# Patient Record
Sex: FEMALE | Race: WHITE | NOT HISPANIC OR LATINO | Employment: FULL TIME | URBAN - METROPOLITAN AREA
[De-identification: names, ages, dates, MRNs, and addresses within clinical notes are randomized per-mention and may not be internally consistent; named-entity substitution may affect disease eponyms.]

---

## 2020-10-25 ENCOUNTER — OFFICE VISIT (OUTPATIENT)
Dept: URGENT CARE | Facility: CLINIC | Age: 55
End: 2020-10-25
Payer: COMMERCIAL

## 2020-10-25 VITALS
HEART RATE: 81 BPM | RESPIRATION RATE: 16 BRPM | TEMPERATURE: 97 F | OXYGEN SATURATION: 99 % | SYSTOLIC BLOOD PRESSURE: 113 MMHG | DIASTOLIC BLOOD PRESSURE: 69 MMHG

## 2020-10-25 DIAGNOSIS — Z20.822 EXPOSURE TO COVID-19 VIRUS: Primary | ICD-10-CM

## 2020-10-25 LAB
CTP QC/QA: YES
SARS-COV-2 RDRP RESP QL NAA+PROBE: NEGATIVE

## 2020-10-25 PROCEDURE — U0002 COVID-19 LAB TEST NON-CDC: HCPCS | Mod: QW,S$GLB,, | Performed by: INTERNAL MEDICINE

## 2020-10-25 PROCEDURE — 99214 OFFICE O/P EST MOD 30 MIN: CPT | Mod: S$GLB,CS,, | Performed by: INTERNAL MEDICINE

## 2020-10-25 PROCEDURE — U0002: ICD-10-PCS | Mod: QW,S$GLB,, | Performed by: INTERNAL MEDICINE

## 2020-10-25 PROCEDURE — 99214 PR OFFICE/OUTPT VISIT, EST, LEVL IV, 30-39 MIN: ICD-10-PCS | Mod: S$GLB,CS,, | Performed by: INTERNAL MEDICINE

## 2020-10-25 RX ORDER — BUTALBITAL, ACETAMINOPHEN AND CAFFEINE 50; 325; 40 MG/1; MG/1; MG/1
TABLET ORAL
COMMUNITY
Start: 2020-10-09

## 2020-10-25 RX ORDER — QUETIAPINE FUMARATE 25 MG/1
TABLET, FILM COATED ORAL
COMMUNITY
Start: 2020-10-20 | End: 2022-09-28 | Stop reason: CLARIF

## 2020-10-25 RX ORDER — OMEPRAZOLE 40 MG/1
40 CAPSULE, DELAYED RELEASE ORAL DAILY
COMMUNITY
Start: 2020-08-16

## 2020-10-25 RX ORDER — SITAGLIPTIN AND METFORMIN HYDROCHLORIDE 1000; 50 MG/1; MG/1
1 TABLET, FILM COATED ORAL 2 TIMES DAILY WITH MEALS
COMMUNITY
Start: 2020-10-09

## 2020-10-25 RX ORDER — FLUTICASONE PROPIONATE 50 MCG
SPRAY, SUSPENSION (ML) NASAL
COMMUNITY
Start: 2020-10-10 | End: 2022-09-28 | Stop reason: CLARIF

## 2020-10-25 NOTE — PATIENT INSTRUCTIONS
Below are suggestions for symptomatic relief:    - Tylenol every 4 hours OR ibuprofen every 6 hours as needed for pain/fever/chills/body aches  - Salt water gargles to soothe throat pain.  - Chloroseptic spray also helps to numb throat pain.  - Warm face compresses to help with facial sinus pain/pressure.  - Vicks vapor rub at night.  - Flonase OTC nasal spray for nasal congestion.  - Simple foods like chicken noodle soup, smoothies, hot tea with lemon and honey  - If you were not given a prescription cough medication, then Delsym OTC helps with coughing at night  - Zyrtec/Claritin during the day & Benadryl at night may help with any allergies        Please follow up with your primary care provider within 2-5 days if your signs and symptoms have not resolved or worsen.    Please arrange follow up with your primary care doctor as soon as possible. You must understand that you've received an Urgent Care treatment only and that you may be released before all of your medical problems are known or treated. You, the patient, will arrange for follow up as instructed. If your symptoms worsen or fail to improve you should go to the Emergency Room.    *Please go immediately to the Emergency Department if you develop shortness of breath, chest pain, and uncontrollable fever above 100.4F            SOCIAL DISTANCE + WEAR A MASK :)      Instructions for Patients with Confirmed or Suspected COVID-19    If you are awaiting your test result, you will either be called or it will be released to the patient portal.  If you have any questions about your test, please visit [email or web address] or call our COVID-19 information line at [phone].      Instructions for non-hospitalized or discharged patients with confirmed or suspected COVID-19:       Stay home except to get medical care.    Separate yourself from other people and animals in your home.    Call ahead before visiting your doctor.    Wear a face mask.     Cover your coughs and sneezes.    Clean your hands often.    Avoid sharing personal household items.    Clean all high-touch surfaces every day.    Monitor your symptoms. Seek prompt medical attention if your illness is worsening (e.g., difficulty breathing). Before seeking care, call your healthcare provider.    If you have a medical emergency and must call 911, notify the dispatcher that you have or are being evaluated for COVID-19. If possible, put on a face mask before emergency medical services arrive.    Use the following symptom-based strategy to return to normal activity following a suspected or confirmed case of COVID-19. Continue isolation until:   o At least 3 days (72 hours) have passed since recovery defined as resolution of fever without the use of fever-reducing medications and improvement in respiratory symptoms (e.g. cough, shortness of breath), and   o At least 10 days have passed since the first positive test.       As one of the next steps, you will receive a call or text from the [place] [facility] ([facility]) COVID-19 Tracing Team. See the contact information below so you know not to ignore the health departments call. It is important that you contact them back immediately so they can help.     Contact Tracer Number:  [phone]  Caller ID for most carriers: [facility]    What is contact tracing?   Contact tracing is a process that helps identify everyone who has been in close contact with an infected person. Contact tracers let those people know they may have been exposed and guide them on next steps. Confidentiality is important for everyone; no one will be told who may have exposed them to the virus.   Your involvement is important. The more we know about where and how this virus is spreading, the better chance we have at stopping it from spreading further.  What does exposure mean?   Exposure means you have been within 6 feet for more than 15 minutes with a  person who has or had COVID-19.  What kind of questions do the contact tracers ask?   A contact tracer will confirm your basic contact information including name, address, phone number, and next of kin, as well as asking about any symptoms you may have had. Theyll also ask you how you think you may have gotten sick, such as places where you may have been exposed to the virus, and people you were with. Those names will never be shared with anyone outside of that call, and will only be used to help trace and stop the spread of the virus.   I have privacy concerns. How will the state use my information?   Your privacy about your health is important. All calls are completed using call centers that use the appropriate health privacy protection measures (HIPAA compliance), meaning that your patient information is safe. No one will ever ask you any questions related to immigration status. Your health comes first.   Do I have to participate?   You do not have to participate, but we strongly encourage you to. Contact tracing can help us catch and control new outbreaks as theyre developing to keep your friends and family safe.   What if I dont hear from anyone?   If you dont receive a call within 24 hours, you can call the number above right away to inquire about your status. That line is open from 8:00 am - 8:00 p.m., 7 days a week.  Contact tracing saves lives! Together, we have the power to beat this virus and keep our loved ones and neighbors safe.       Instructions for household members, intimate partners and caregivers in a non-healthcare setting of a patient with confirmed or suspected COVID-19:         Close contacts should monitor their health and call their healthcare provider right away if they develop symptoms suggestive of COVID-19 (e.g., fever, cough, shortness of breath).    Stay home except to get medical care. Separate yourself from other people and animals in the home.   Monitor the patients  symptoms. If the patient is getting sicker, call his or her healthcare provider. If the patient has a medical emergency and you need to call 911, notify the dispatch personnel that the patient has or is being evaluated for COVID-19.    Wear a facemask when around other people such as sharing a room or vehicle and before entering a healthcare provider's office.   Cover coughs and sneezes with a tissue. Throw used tissues in a lined trash can immediately and wash hands.   Clean hands often with soap and water for at least 20 seconds or with an alcohol-based hand , rubbing hands together until they feel dry. Avoid touching your eyes, nose, and mouth with unwashed hands.   Clean all high-touch; surfaces every day, including counters, tabletops, doorknobs, bathroom fixtures, toilets, phones, keyboards, tablets, bedside tables, etc. Use a household cleaning spray or wipe according to label instructions.   Avoid sharing personal household items such as dishes, drinking glasses, cups, towels, bedding, etc. After these items are used, they should be washed thoroughly with soap and water.   Continue isolation until:   At least 3 days (72 hours) have passed since recovery defined as resolution of fever without the use of fever-reducing medications and improvement in respiratory symptoms (e.g. cough, shortness of breath), and    At least 10 days have passed since the patients first positive test.    https://www.cdc.gov/coronavirus/2019-ncov/your-health/index.htm

## 2020-10-25 NOTE — LETTER
[address], [address] ? [place], [address] ? Phone [phone] ? Fax [phone]           Return to Work/School    Patient: Kelli Rosas  YOB: 1965   Date: 10/25/2020      To Whom It May Concern:     Kelli Rosas was in contact with/seen in my office on 10/25/2020. COVID-19 is present in our communities across the [facility]. Not all patients are eligible or appropriate to be tested. In this situation, your employee meets the following criteria:     Kelli Rosas has met the criteria for COVID-19 testing and has a NEGATIVE result. The employee can return to work once they are asymptomatic for 24 hours without the use of fever reducing medications (Tylenol, Motrin, etc).     If you have any questions or concerns, or if I can be of further assistance, please do not hesitate to contact me.     Sincerely,    Johanna Brown PA-C

## 2020-10-25 NOTE — PROGRESS NOTES
Subjective:       Patient ID: Kelli Rosas is a 54 y.o. female.    Vitals:  temperature is 97.3 °F (36.3 °C). Her blood pressure is 113/69 and her pulse is 81. Her respiration is 16 and oxygen saturation is 99%.     Chief Complaint: COVID-19 Concerns    54-year-old female with past medical history of hypertension presents to urgent care complaining of scratchy throat, body aches and fatigue that started this morning after she woke up.  Symptoms have been constant and mild since onset.  Patient states that she had COVID back in March, but she is concerned she may have COVID again as she just found out she was exposed to her co-worker who tested positive for COVID-19 2 days ago.  Patient has been taking over-the-counter cold and flu which helped her sleep and relieved her itchy throat. Pt denies fever, chills, ear pain, nasal congestion,  loss of smell/taste, cough, SOB, chest pain, N/V/D, abdominal pain, back pain, neck pain, headache.      Fatigue  This is a new problem. The current episode started yesterday. The problem occurs constantly. The problem has been unchanged. Associated symptoms include a sore throat. Pertinent negatives include no arthralgias, chest pain, chills, congestion, coughing, fatigue, fever, headaches, joint swelling, myalgias, nausea, rash, vertigo, vomiting or weakness. Associated symptoms comments: Body aches. Treatments tried: otc cold and flu. The treatment provided mild relief.       Constitution: Negative for chills, fatigue and fever.   HENT: Positive for sore throat. Negative for congestion.    Neck: Negative for painful lymph nodes.   Cardiovascular: Negative for chest pain and leg swelling.   Eyes: Negative for double vision and blurred vision.   Respiratory: Negative for cough and shortness of breath.    Gastrointestinal: Negative for nausea, vomiting and diarrhea.   Genitourinary: Negative for dysuria, frequency, urgency and history of kidney stones.   Musculoskeletal:  Negative for joint pain, joint swelling, muscle cramps and muscle ache.   Skin: Negative for color change, pale, rash and bruising.   Allergic/Immunologic: Negative for seasonal allergies.   Neurological: Negative for dizziness, history of vertigo, light-headedness, passing out and headaches.   Hematologic/Lymphatic: Negative for swollen lymph nodes.   Psychiatric/Behavioral: Negative for nervous/anxious, sleep disturbance and depression. The patient is not nervous/anxious.        Objective:      Physical Exam   Constitutional: She is oriented to person, place, and time. She appears well-developed.  Non-toxic appearance. She does not appear ill. No distress.   HENT:   Head: Normocephalic and atraumatic.   Ears:   Right Ear: No swelling or tenderness. Tympanic membrane is not erythematous, not retracted and not bulging. A middle ear effusion is present.   Left Ear: No swelling or tenderness. Tympanic membrane is not erythematous, not retracted and not bulging. A middle ear effusion is present.   Nose: No mucosal edema or rhinorrhea. Right sinus exhibits no maxillary sinus tenderness and no frontal sinus tenderness. Left sinus exhibits no maxillary sinus tenderness and no frontal sinus tenderness.   Mouth/Throat: Uvula is midline and mucous membranes are normal. No trismus in the jaw. No uvula swelling. Posterior oropharyngeal edema and posterior oropharyngeal erythema present. No oropharyngeal exudate or tonsillar abscesses. No tonsillar exudate.   Neck: Neck supple.   Cardiovascular: Normal rate and regular rhythm.   Pulmonary/Chest: Effort normal. No respiratory distress.   Abdominal: Normal appearance.   Musculoskeletal: Normal range of motion.   Lymphadenopathy:     She has no cervical adenopathy.   Neurological: She is alert and oriented to person, place, and time.   Skin: Skin is warm, dry and not diaphoretic.   Nursing note and vitals reviewed.        Assessment:       1. Exposure to COVID-19 virus         Plan:         Exposure to COVID-19 virus  -     POCT COVID-19 Rapid Screening      Results for orders placed or performed in visit on 10/25/20   POCT COVID-19 Rapid Screening   Result Value Ref Range    POC Rapid COVID Negative Negative     Acceptable Yes      *Discussed results/diagnosis/plan with patient in clinic. Answered all of patient's questions and concerns. Patient was given strict ED instructions. Patient verbally understood and agreed with treatment plan.       Patient Instructions   Below are suggestions for symptomatic relief:    - Tylenol every 4 hours OR ibuprofen every 6 hours as needed for pain/fever/chills/body aches  - Salt water gargles to soothe throat pain.  - Chloroseptic spray also helps to numb throat pain.  - Warm face compresses to help with facial sinus pain/pressure.  - Vicks vapor rub at night.  - Flonase OTC nasal spray for nasal congestion.  - Simple foods like chicken noodle soup, smoothies, hot tea with lemon and honey  - If you were not given a prescription cough medication, then Delsym OTC helps with coughing at night  - Zyrtec/Claritin during the day & Benadryl at night may help with any allergies        Please follow up with your primary care provider within 2-5 days if your signs and symptoms have not resolved or worsen.    Please arrange follow up with your primary care doctor as soon as possible. You must understand that you've received an Urgent Care treatment only and that you may be released before all of your medical problems are known or treated. You, the patient, will arrange for follow up as instructed. If your symptoms worsen or fail to improve you should go to the Emergency Room.    *Please go immediately to the Emergency Department if you develop shortness of breath, chest pain, and uncontrollable fever above 100.4F            SOCIAL DISTANCE + WEAR A MASK :)      Instructions for Patients with Confirmed or Suspected COVID-19    If you are awaiting  your test result, you will either be called or it will be released to the patient portal.  If you have any questions about your test, please visit [email or web address] or call our COVID-19 information line at [phone].      Instructions for non-hospitalized or discharged patients with confirmed or suspected COVID-19:       Stay home except to get medical care.    Separate yourself from other people and animals in your home.    Call ahead before visiting your doctor.    Wear a face mask.    Cover your coughs and sneezes.    Clean your hands often.    Avoid sharing personal household items.    Clean all high-touch surfaces every day.    Monitor your symptoms. Seek prompt medical attention if your illness is worsening (e.g., difficulty breathing). Before seeking care, call your healthcare provider.    If you have a medical emergency and must call 911, notify the dispatcher that you have or are being evaluated for COVID-19. If possible, put on a face mask before emergency medical services arrive.    Use the following symptom-based strategy to return to normal activity following a suspected or confirmed case of COVID-19. Continue isolation until:   o At least 3 days (72 hours) have passed since recovery defined as resolution of fever without the use of fever-reducing medications and improvement in respiratory symptoms (e.g. cough, shortness of breath), and   o At least 10 days have passed since the first positive test.       As one of the next steps, you will receive a call or text from the [facility] ([facility]) COVID-19 Tracing Team. See the contact information below so you know not to ignore the health departments call. It is important that you contact them back immediately so they can help.     Contact Tracer Number:  [phone]  Caller ID for most carriers: [place] [facility]    What is contact tracing?   Contact tracing is a process that helps identify everyone who has been  in close contact with an infected person. Contact tracers let those people know they may have been exposed and guide them on next steps. Confidentiality is important for everyone; no one will be told who may have exposed them to the virus.   Your involvement is important. The more we know about where and how this virus is spreading, the better chance we have at stopping it from spreading further.  What does exposure mean?   Exposure means you have been within 6 feet for more than 15 minutes with a person who has or had COVID-19.  What kind of questions do the contact tracers ask?   A contact tracer will confirm your basic contact information including name, address, phone number, and next of kin, as well as asking about any symptoms you may have had. Theyll also ask you how you think you may have gotten sick, such as places where you may have been exposed to the virus, and people you were with. Those names will never be shared with anyone outside of that call, and will only be used to help trace and stop the spread of the virus.   I have privacy concerns. How will the state use my information?   Your privacy about your health is important. All calls are completed using call centers that use the appropriate health privacy protection measures (HIPAA compliance), meaning that your patient information is safe. No one will ever ask you any questions related to immigration status. Your health comes first.   Do I have to participate?   You do not have to participate, but we strongly encourage you to. Contact tracing can help us catch and control new outbreaks as theyre developing to keep your friends and family safe.   What if I dont hear from anyone?   If you dont receive a call within 24 hours, you can call the number above right away to inquire about your status. That line is open from 8:00 am - 8:00 p.m., 7 days a week.  Contact tracing saves lives! Together, we have the power to beat this virus and keep our  loved ones and neighbors safe.       Instructions for household members, intimate partners and caregivers in a non-healthcare setting of a patient with confirmed or suspected COVID-19:         Close contacts should monitor their health and call their healthcare provider right away if they develop symptoms suggestive of COVID-19 (e.g., fever, cough, shortness of breath).    Stay home except to get medical care. Separate yourself from other people and animals in the home.   Monitor the patients symptoms. If the patient is getting sicker, call his or her healthcare provider. If the patient has a medical emergency and you need to call 911, notify the dispatch personnel that the patient has or is being evaluated for COVID-19.    Wear a facemask when around other people such as sharing a room or vehicle and before entering a healthcare provider's office.   Cover coughs and sneezes with a tissue. Throw used tissues in a lined trash can immediately and wash hands.   Clean hands often with soap and water for at least 20 seconds or with an alcohol-based hand , rubbing hands together until they feel dry. Avoid touching your eyes, nose, and mouth with unwashed hands.   Clean all high-touch; surfaces every day, including counters, tabletops, doorknobs, bathroom fixtures, toilets, phones, keyboards, tablets, bedside tables, etc. Use a household cleaning spray or wipe according to label instructions.   Avoid sharing personal household items such as dishes, drinking glasses, cups, towels, bedding, etc. After these items are used, they should be washed thoroughly with soap and water.   Continue isolation until:   At least 3 days (72 hours) have passed since recovery defined as resolution of fever without the use of fever-reducing medications and improvement in respiratory symptoms (e.g. cough, shortness of breath), and    At least 10 days have passed since the patients first positive  test.    https://www.cdc.gov/coronavirus/2019-ncov/your-health/index.htm

## 2022-08-06 ENCOUNTER — HOSPITAL ENCOUNTER (EMERGENCY)
Facility: HOSPITAL | Age: 57
Discharge: HOME | End: 2022-08-06
Attending: EMERGENCY MEDICINE
Payer: COMMERCIAL

## 2022-08-06 VITALS
OXYGEN SATURATION: 97 % | BODY MASS INDEX: 38.32 KG/M2 | DIASTOLIC BLOOD PRESSURE: 83 MMHG | HEIGHT: 65 IN | WEIGHT: 230 LBS | TEMPERATURE: 98 F | RESPIRATION RATE: 20 BRPM | HEART RATE: 113 BPM | SYSTOLIC BLOOD PRESSURE: 136 MMHG

## 2022-08-06 DIAGNOSIS — T14.90XA TRAUMA: ICD-10-CM

## 2022-08-06 DIAGNOSIS — S92.521A CLOSED DISPLACED FRACTURE OF MIDDLE PHALANX OF LESSER TOE OF RIGHT FOOT, INITIAL ENCOUNTER: Primary | ICD-10-CM

## 2022-08-06 PROCEDURE — 25000003 PHARM REV CODE 250: Mod: ER | Performed by: EMERGENCY MEDICINE

## 2022-08-06 PROCEDURE — 96372 THER/PROPH/DIAG INJ SC/IM: CPT | Performed by: EMERGENCY MEDICINE

## 2022-08-06 PROCEDURE — 99284 EMERGENCY DEPT VISIT MOD MDM: CPT | Mod: 25,ER

## 2022-08-06 PROCEDURE — 63600175 PHARM REV CODE 636 W HCPCS: Mod: ER | Performed by: EMERGENCY MEDICINE

## 2022-08-06 RX ORDER — KETOROLAC TROMETHAMINE 30 MG/ML
30 INJECTION, SOLUTION INTRAMUSCULAR; INTRAVENOUS
Status: COMPLETED | OUTPATIENT
Start: 2022-08-06 | End: 2022-08-06

## 2022-08-06 RX ORDER — FAMOTIDINE 20 MG/1
20 TABLET, FILM COATED ORAL 2 TIMES DAILY
Qty: 60 TABLET | Refills: 0 | Status: SHIPPED | OUTPATIENT
Start: 2022-08-06 | End: 2023-08-06

## 2022-08-06 RX ORDER — OXYCODONE AND ACETAMINOPHEN 5; 325 MG/1; MG/1
1 TABLET ORAL
Status: COMPLETED | OUTPATIENT
Start: 2022-08-06 | End: 2022-08-06

## 2022-08-06 RX ORDER — MELOXICAM 15 MG/1
15 TABLET ORAL DAILY
Qty: 30 TABLET | Refills: 0 | Status: SHIPPED | OUTPATIENT
Start: 2022-08-06 | End: 2022-09-28 | Stop reason: CLARIF

## 2022-08-06 RX ORDER — OXYCODONE AND ACETAMINOPHEN 5; 325 MG/1; MG/1
1 TABLET ORAL EVERY 4 HOURS PRN
Qty: 12 TABLET | Refills: 0 | Status: SHIPPED | OUTPATIENT
Start: 2022-08-06 | End: 2022-10-04

## 2022-08-06 RX ADMIN — KETOROLAC TROMETHAMINE 30 MG: 30 INJECTION, SOLUTION INTRAMUSCULAR at 10:08

## 2022-08-06 RX ADMIN — OXYCODONE AND ACETAMINOPHEN 1 TABLET: 5; 325 TABLET ORAL at 10:08

## 2022-08-06 NOTE — Clinical Note
"Kelli Maldonadojoseline Rosas was seen and treated in our emergency department on 8/6/2022.  She may return to work on 08/10/2022.       If you have any questions or concerns, please don't hesitate to call.      Mehran Jones MD"

## 2022-08-06 NOTE — ED PROVIDER NOTES
Encounter Date: 2022       History     Chief Complaint   Patient presents with    Fall     Pt reports slip and fall prior to arrival, pain to right third toe, deformity noted. States pain does radiate to right ankle.     56 y.o. female Past Medical History:  No date: Hypertension     Endorses mechanical fall onto L knee prior to arrival, notes no knee pain, no ankle pain, endorses R 3rd toe pain/deformity. Denies hitting her head, no neck/back pain        Review of patient's allergies indicates:  No Known Allergies  Past Medical History:   Diagnosis Date    Hypertension      Past Surgical History:   Procedure Laterality Date     SECTION      NECK MASS EXCISION      pph hemorrhoidectomy       Family History   Problem Relation Age of Onset    Hypertension Paternal Grandmother      Social History     Tobacco Use    Smoking status: Never Smoker    Smokeless tobacco: Never Used   Substance Use Topics    Alcohol use: Not Currently     Comment: social    Drug use: No     Review of Systems   Constitutional: Negative for fever.   HENT: Negative for sore throat.    Respiratory: Negative for shortness of breath.    Cardiovascular: Negative for chest pain.   Gastrointestinal: Negative for nausea.   Genitourinary: Negative for dysuria.   Musculoskeletal: Negative for back pain.   Skin: Negative for rash.   Neurological: Negative for weakness.   Hematological: Does not bruise/bleed easily.   All other systems reviewed and are negative.      Physical Exam     Initial Vitals [22 0947]   BP Pulse Resp Temp SpO2   136/83 (!) 113 18 98.1 °F (36.7 °C) 97 %      MAP       --         Physical Exam    Nursing note and vitals reviewed.  Constitutional: She appears well-developed and well-nourished.   HENT:   Head: Normocephalic and atraumatic.   Eyes: Conjunctivae and EOM are normal. Pupils are equal, round, and reactive to light.   Neck:   Normal range of motion.  Cardiovascular: Normal rate.   Pulmonary/Chest:  No respiratory distress.   Abdominal: She exhibits no distension.   Musculoskeletal:         General: Normal range of motion.      Cervical back: Normal range of motion.     Neurological: She is alert. No cranial nerve deficit. GCS score is 15. GCS eye subscore is 4. GCS verbal subscore is 5. GCS motor subscore is 6.   Skin: Skin is warm and dry.   Psychiatric: She has a normal mood and affect. Thought content normal.     R knee: no ttp/effusion, no ant/post drawer/med/lat laxity, no prox tib/fib ttp, no distal tib/fib ttp, no med/lat malleolar ttp    R 3rd toe deformity, good cap refill  L knee: stable, no joint line ttp, no effusion, no patellar ttp, patella in appropriate position, no ant/post drawer, no med/lab laxity.  ED Course   Procedures  Labs Reviewed - No data to display       Imaging Results          X-Ray Foot Complete Right (Final result)  Result time 08/06/22 10:23:52    Final result by Ovidio Meza MD (08/06/22 10:23:52)                 Impression:      Mildly displaced and angulated acute fracture of the 3rd proximal phalanx.      Electronically signed by: Ovidio Meza MD  Date:    08/06/2022  Time:    10:23             Narrative:    EXAMINATION:  XR FOOT COMPLETE 3 VIEW RIGHT    CLINICAL HISTORY:  Injury, unspecified, initial encounter.    TECHNIQUE:  AP, lateral, and oblique views of the right foot were performed.    COMPARISON:  None.    FINDINGS:  Mildly displaced acute fracture involving the distal aspect of the 3rd proximal phalanx.  Mild lateral angulation of the fracture.  No additional acute fracture identified.  No dislocation.  There is a plantar calcaneal spur.  No unexpected radiopaque foreign body.                                 Medications   oxyCODONE-acetaminophen 5-325 mg per tablet 1 tablet (1 tablet Oral Given 8/6/22 1009)   ketorolac injection 30 mg (30 mg Intramuscular Given 8/6/22 1009)                   xr foot, flat sole shoe. Pain control.     Crutches.  X-Ray Foot  Complete Right   Final Result      Mildly displaced and angulated acute fracture of the 3rd proximal phalanx.         Electronically signed by: Ovidio Meza MD   Date:    08/06/2022   Time:    10:23          I have corky taped toe. Will refer to podiatry    Clinical Impression:   Final diagnoses:  [T14.90XA] Trauma  [T14.90XA] Trauma - 3rd toe pain/deformity  [S92.521A] Closed displaced fracture of middle phalanx of lesser toe of right foot, initial encounter (Primary)          ED Disposition Condition    Discharge Stable        ED Prescriptions     None        Follow-up Information    None          Mehran Jones MD  08/06/22 6457

## 2022-08-06 NOTE — DISCHARGE INSTRUCTIONS
Thank you for coming to our Emergency Department today. It is important to remember that some problems or medical conditions are difficult to diagnose and may not be found during your Emergency Department visit.     Be sure to follow up with your primary care doctor and review all labs/imaging/tests that were performed during your ER visit with them. Some labs/tests may be outside of the normal range and require non-emergent follow-up and further investigation to help diagnose/exclude/prevent complications or other potentially serious medical conditions that were not addressed during your ER visit.    If you do not have a primary care doctor, you may contact the one listed on your discharge paperwork or you may also call the [facility] Appointment Desk at [phone] to schedule an appointment and establish care with one. It is important to your health that you have a primary care doctor.    Please take all medications as directed. All medications may potentially have side-effects and it is impossible to predict which medications may give you side-effects or what side-effects (if any) they will give you.. If you feel that you are having a negative effect or side-effect of any medication you should immediately stop taking them and seek medical attention. If you feel that you are having a life-threatening reaction call 911.    Return to the ER with any questions/concerns, new/concerning symptoms, worsening or failure to improve.     Do not drive, swim, climb to height, take a bath, operate heavy machinery, drink alcohol or take potentially sedating medications, sign any legal documents or make any important decisions for 24 hours if you have received any pain medications, sedatives or mood altering drugs during your ER visit or within 24 hours of taking them if they have been prescribed to you.     You can find additional resources for Dentists, hearing aids, durable medical equipment, low cost pharmacies and  other resources at https://[facility].org    BELOW THIS LINE ONLY APPLIES IF YOU HAVE A COVID TEST PENDING OR IF YOU HAVE BEEN DIAGNOSED WITH COVID:  Please access [facility] to review the results of your test. Until the results of your COVID test return, you should isolate yourself so as not to potentially spread illness to others.   If your COVID test returns positive, you should isolate yourself so as not to spread illness to others. After five full days, if you are feeling better and you have not had fever for 24 hours, you can return to your typical daily activities, but you must wear a mask around others for an additional 5 days.   If your COVID test returns negative and you are either unvaccinated or more than six months out from your two-dose vaccine and are not yet boosted, you should still quarantine for 5 full days followed by strict mask use for an additional 5 full days.   If your COVID test returns negative and you have received your 2-dose initial vaccine as well as a booster, you should continue strict mask use for 10 full days after the exposure.  For all those exposed, best practice includes a test at day 5 after the exposure. This can be a home test or a test through one of the many testing centers throughout our community.   Masking is always advised to limit the spread of COVID. Cdc.gov is an excellent site to obtain the latest up to date recommendations regarding COVID and COVID testing.     CDC Testing and Quarantine Guidelines for patients with exposure to a known-positive COVID-19 person:  A close exposure is defined as anyone who has had an exposure (masked or unmasked) to a known COVID -19 positive person within 6 feet of someone for a cumulative total of 15 minutes or more over a 24-hour period.   Vaccinated and/or if you recently had a positive covid test within 90 days do NOT need to quarantine after contact with someone who had COVID-19 unless you develop symptoms.   Fully vaccinated  people who have not had a positive test within 90 days, should get tested 3-5 days after their exposure, even if they don't have symptoms and wear a mask indoors in public for 14 days following exposure or until their test result is negative.      Unvaccinated and/or NOT had a positive test within 90 days and meet close exposure  You are required by CDC guidelines to quarantine for at least 5 days from time of exposure followed by 5 days of strict masking. It is recommended, but not required to test after 5 days, unless you develop symptoms, in which case you should test at that time.  If you get tested after 5 days and your test is positive, your 5 day period of isolation starts the day of the positive test.    If your exposure does not meet the above definition, you can return to your normal daily activities to include social distancing, wearing a mask and frequent handwashing.      Here is a link to guidance from the CDC:  https://www.cdc.gov/media/releases/2021/s1227-isolation-quarantine-guidance.html      [place] Dept Of Health Testing Sites:  https://ldh.la.gov/page/3934      [facility] website with testing locations and guidance:  [email or web address]

## 2022-09-21 ENCOUNTER — TELEPHONE (OUTPATIENT)
Dept: SURGERY | Facility: CLINIC | Age: 57
End: 2022-09-21
Payer: COMMERCIAL

## 2022-09-21 NOTE — TELEPHONE ENCOUNTER
Attempted to contact patient regarding msg, unable to reach left brief VM to contact office.         ----- Message from Tao Roberts sent at 9/21/2022  1:12 PM CDT -----  Type: Patient Call Back    Who called:Self    What is the request in detail: Pt is requesting an appt. Pt is referral by Dr. Goins. Pt has had several gallbladder attack with ER visit.    Can the clinic reply by [facility]? no    Would the patient rather a call back or a response via [facility]? Call back    Best call back number: [phone] (home) [phone]      Additional Information

## 2022-09-21 NOTE — TELEPHONE ENCOUNTER
----- Message from Rosetta Fernandez sent at 9/21/2022  1:45 PM CDT -----  Name of Who is Calling: ENA PHILLIPS [[id number]]              What is the request in detail: Patient is requesting a call back to discuss scheduling an appointment with Dr. Ulloa. Please contact patient to schedule.               Can the clinic reply by [facility]: NO              What Number to Call Back if not in [facility]: [phone] [phone]

## 2022-09-21 NOTE — TELEPHONE ENCOUNTER
----- Message from Kimberlee Lynn sent at 9/21/2022  4:00 PM CDT -----  Regarding: self  [phone] ext [phone]  .Type:  Patient Returning Call    Who Called: self     Who Left Message for Patient:  Tamica Jc    Does the patient know what this is regarding? Yes     Would the patient rather a call back or a response via [facility]? Call back     Best Call Back Number: [phone] ext [phone] Do not call cell due to pt working

## 2022-09-22 ENCOUNTER — TELEPHONE (OUTPATIENT)
Dept: SURGERY | Facility: CLINIC | Age: 57
End: 2022-09-22
Payer: COMMERCIAL

## 2022-09-22 NOTE — TELEPHONE ENCOUNTER
Spoke with Ms.Meghan regarding fax referral, informed her we have not received it, gave out correct clinic fax number.      ----- Message from Thomas Yadav sent at 9/22/2022  9:00 AM CDT -----  Type: Patient Call Back    Who called:Jeanie Christianson    What is the request in detail:would like to know if fax was received on 9/21.     Can the clinic reply [facility]no    Would the patient rather a call back or a response via [facility]? call    Best call back number: [phone]

## 2022-09-23 ENCOUNTER — OFFICE VISIT (OUTPATIENT)
Dept: SURGERY | Facility: CLINIC | Age: 57
End: 2022-09-23
Payer: COMMERCIAL

## 2022-09-23 ENCOUNTER — ANESTHESIA EVENT (OUTPATIENT)
Dept: SURGERY | Facility: HOSPITAL | Age: 57
End: 2022-09-23
Payer: COMMERCIAL

## 2022-09-23 VITALS
HEIGHT: 65 IN | HEART RATE: 93 BPM | BODY MASS INDEX: 39.13 KG/M2 | DIASTOLIC BLOOD PRESSURE: 79 MMHG | SYSTOLIC BLOOD PRESSURE: 110 MMHG | WEIGHT: 234.88 LBS | OXYGEN SATURATION: 99 %

## 2022-09-23 DIAGNOSIS — K80.10 CHOLECYSTITIS WITH CHOLELITHIASIS: ICD-10-CM

## 2022-09-23 DIAGNOSIS — K81.9 CHOLECYSTITIS: Primary | ICD-10-CM

## 2022-09-23 PROCEDURE — 3008F PR BODY MASS INDEX (BMI) DOCUMENTED: ICD-10-PCS | Mod: CPTII,S$GLB,, | Performed by: SURGERY

## 2022-09-23 PROCEDURE — 99999 PR PBB SHADOW E&M-EST. PATIENT-LVL V: CPT | Mod: PBBFAC,,, | Performed by: SURGERY

## 2022-09-23 PROCEDURE — 1159F MED LIST DOCD IN RCRD: CPT | Mod: CPTII,S$GLB,, | Performed by: SURGERY

## 2022-09-23 PROCEDURE — 1159F PR MEDICATION LIST DOCUMENTED IN MEDICAL RECORD: ICD-10-PCS | Mod: CPTII,S$GLB,, | Performed by: SURGERY

## 2022-09-23 PROCEDURE — 3078F PR MOST RECENT DIASTOLIC BLOOD PRESSURE < 80 MM HG: ICD-10-PCS | Mod: CPTII,S$GLB,, | Performed by: SURGERY

## 2022-09-23 PROCEDURE — 99204 OFFICE O/P NEW MOD 45 MIN: CPT | Mod: S$GLB,,, | Performed by: SURGERY

## 2022-09-23 PROCEDURE — 3008F BODY MASS INDEX DOCD: CPT | Mod: CPTII,S$GLB,, | Performed by: SURGERY

## 2022-09-23 PROCEDURE — 3074F SYST BP LT 130 MM HG: CPT | Mod: CPTII,S$GLB,, | Performed by: SURGERY

## 2022-09-23 PROCEDURE — 99999 PR PBB SHADOW E&M-EST. PATIENT-LVL V: ICD-10-PCS | Mod: PBBFAC,,, | Performed by: SURGERY

## 2022-09-23 PROCEDURE — 3078F DIAST BP <80 MM HG: CPT | Mod: CPTII,S$GLB,, | Performed by: SURGERY

## 2022-09-23 PROCEDURE — 3074F PR MOST RECENT SYSTOLIC BLOOD PRESSURE < 130 MM HG: ICD-10-PCS | Mod: CPTII,S$GLB,, | Performed by: SURGERY

## 2022-09-23 PROCEDURE — 1160F PR REVIEW ALL MEDS BY PRESCRIBER/CLIN PHARMACIST DOCUMENTED: ICD-10-PCS | Mod: CPTII,S$GLB,, | Performed by: SURGERY

## 2022-09-23 PROCEDURE — 4010F PR ACE/ARB THEARPY RXD/TAKEN: ICD-10-PCS | Mod: CPTII,S$GLB,, | Performed by: SURGERY

## 2022-09-23 PROCEDURE — 1160F RVW MEDS BY RX/DR IN RCRD: CPT | Mod: CPTII,S$GLB,, | Performed by: SURGERY

## 2022-09-23 PROCEDURE — 99204 PR OFFICE/OUTPT VISIT, NEW, LEVL IV, 45-59 MIN: ICD-10-PCS | Mod: S$GLB,,, | Performed by: SURGERY

## 2022-09-23 PROCEDURE — 4010F ACE/ARB THERAPY RXD/TAKEN: CPT | Mod: CPTII,S$GLB,, | Performed by: SURGERY

## 2022-09-23 RX ORDER — INDOCYANINE GREEN AND WATER 25 MG
5 KIT INJECTION
Status: CANCELLED | OUTPATIENT
Start: 2022-09-23

## 2022-09-23 RX ORDER — SODIUM CHLORIDE 9 MG/ML
INJECTION, SOLUTION INTRAVENOUS CONTINUOUS
Status: CANCELLED | OUTPATIENT
Start: 2022-09-23

## 2022-09-23 RX ORDER — LIDOCAINE HYDROCHLORIDE 10 MG/ML
1 INJECTION, SOLUTION EPIDURAL; INFILTRATION; INTRACAUDAL; PERINEURAL ONCE
Status: CANCELLED | OUTPATIENT
Start: 2022-09-23 | End: 2022-09-23

## 2022-09-23 NOTE — H&P (VIEW-ONLY)
History and Physical Exam    Patient ID: Kelli Rosas is a 56 y.o. female.    Chief Complaint: Gall Bladder Problem      HPI:  57 y/o woman with history of RUQ abd pain, worse with food, associated with nasuea and episodes of vomitting.       Review of Systems   Constitutional:  Negative for chills and unexpected weight change.   HENT:  Negative for congestion, ear pain and sore throat.    Eyes:  Negative for pain and redness.   Respiratory:  Negative for cough and shortness of breath.    Cardiovascular:  Negative for chest pain and palpitations.   Gastrointestinal:  Negative for abdominal distention, abdominal pain and constipation.   Endocrine: Negative for cold intolerance and heat intolerance.   Genitourinary:  Negative for dysuria and frequency.   Musculoskeletal:  Negative for back pain and neck pain.   Skin:  Negative for pallor and rash.   Neurological:  Negative for syncope, light-headedness and headaches.   Hematological:  Negative for adenopathy. Does not bruise/bleed easily.   Psychiatric/Behavioral:  Negative for confusion and hallucinations.      Current Outpatient Medications   Medication Sig Dispense Refill    butalbital-acetaminophen-caffeine -40 mg (FIORICET, ESGIC) -40 mg per tablet TAKE 1 TABLET BY MOUTH EVERY 4 (FOUR) HOURS AS NEEDED FOR HEADACHES MAX DAILY AMOUNT  6 TABLETS      famotidine (PEPCID) 20 MG tablet Take 1 tablet (20 mg total) by mouth 2 (two) times daily. 60 tablet 0    fluticasone propionate (FLONASE) 50 mcg/actuation nasal spray SPRAY 1 SPRAY INTO EACH NOSTRIL TWICE A DAY      furosemide (LASIX) 40 MG tablet       JANUMET 50-1,000 mg per tablet Take 1 tablet by mouth 2 (two) times daily with meals.      lisinopril (PRINIVIL,ZESTRIL) 40 MG tablet       lorazepam (ATIVAN) 0.5 MG tablet       omeprazole (PRILOSEC) 40 MG capsule Take 40 mg by mouth every morning.      oxyCODONE-acetaminophen (PERCOCET) 5-325 mg per tablet Take 1 tablet by mouth every 4 (four) hours  as needed for Pain. 12 tablet 0    meloxicam (MOBIC) 15 MG tablet Take 1 tablet (15 mg total) by mouth once daily. 30 tablet 0    QUEtiapine (SEROQUEL) 25 MG Tab        No current facility-administered medications for this visit.       Review of patient's allergies indicates:  No Known Allergies    Past Medical History:   Diagnosis Date    Hypertension        Past Surgical History:   Procedure Laterality Date     SECTION      NECK MASS EXCISION      pph hemorrhoidectomy         Family History   Problem Relation Age of Onset    Hypertension Paternal Grandmother        Social History     Socioeconomic History    Marital status:    Tobacco Use    Smoking status: Never    Smokeless tobacco: Never   Substance and Sexual Activity    Alcohol use: Not Currently     Comment: social    Drug use: No    Sexual activity: Not Currently       Vitals:    22 1123   BP: 110/79   Pulse: 93       Physical Exam  Constitutional:       Appearance: She is well-developed.   HENT:      Head: Normocephalic and atraumatic.   Eyes:      Pupils: Pupils are equal, round, and reactive to light.   Cardiovascular:      Rate and Rhythm: Normal rate and regular rhythm.      Heart sounds: No murmur heard.  Pulmonary:      Effort: Pulmonary effort is normal.      Breath sounds: Normal breath sounds. No wheezing.   Abdominal:      General: There is no distension.      Palpations: Abdomen is soft.      Tenderness: There is no abdominal tenderness.   Musculoskeletal:         General: Normal range of motion.      Cervical back: Normal range of motion and neck supple.   Skin:     General: Skin is warm and dry.      Capillary Refill: Capillary refill takes less than 2 seconds.      Findings: No rash.   Neurological:      Mental Status: She is alert and oriented to person, place, and time.   Psychiatric:         Judgment: Judgment normal.       Assessment & Plan:   Cholecystitis plan lap mamadou     Risks, benefits, alternatives to the  procedure were discussed with the patient, who appears to understand and agree with the treatment plan.

## 2022-09-23 NOTE — PROGRESS NOTES
History and Physical Exam    Patient ID: Kelli Rosas is a 56 y.o. female.    Chief Complaint: Gall Bladder Problem      HPI:  55 y/o woman with history of RUQ abd pain, worse with food, associated with nasuea and episodes of vomitting.       Review of Systems   Constitutional:  Negative for chills and unexpected weight change.   HENT:  Negative for congestion, ear pain and sore throat.    Eyes:  Negative for pain and redness.   Respiratory:  Negative for cough and shortness of breath.    Cardiovascular:  Negative for chest pain and palpitations.   Gastrointestinal:  Negative for abdominal distention, abdominal pain and constipation.   Endocrine: Negative for cold intolerance and heat intolerance.   Genitourinary:  Negative for dysuria and frequency.   Musculoskeletal:  Negative for back pain and neck pain.   Skin:  Negative for pallor and rash.   Neurological:  Negative for syncope, light-headedness and headaches.   Hematological:  Negative for adenopathy. Does not bruise/bleed easily.   Psychiatric/Behavioral:  Negative for confusion and hallucinations.      Current Outpatient Medications   Medication Sig Dispense Refill    butalbital-acetaminophen-caffeine -40 mg (FIORICET, ESGIC) -40 mg per tablet TAKE 1 TABLET BY MOUTH EVERY 4 (FOUR) HOURS AS NEEDED FOR HEADACHES MAX DAILY AMOUNT  6 TABLETS      famotidine (PEPCID) 20 MG tablet Take 1 tablet (20 mg total) by mouth 2 (two) times daily. 60 tablet 0    fluticasone propionate (FLONASE) 50 mcg/actuation nasal spray SPRAY 1 SPRAY INTO EACH NOSTRIL TWICE A DAY      furosemide (LASIX) 40 MG tablet       JANUMET 50-1,000 mg per tablet Take 1 tablet by mouth 2 (two) times daily with meals.      lisinopril (PRINIVIL,ZESTRIL) 40 MG tablet       lorazepam (ATIVAN) 0.5 MG tablet       omeprazole (PRILOSEC) 40 MG capsule Take 40 mg by mouth every morning.      oxyCODONE-acetaminophen (PERCOCET) 5-325 mg per tablet Take 1 tablet by mouth every 4 (four) hours  as needed for Pain. 12 tablet 0    meloxicam (MOBIC) 15 MG tablet Take 1 tablet (15 mg total) by mouth once daily. 30 tablet 0    QUEtiapine (SEROQUEL) 25 MG Tab        No current facility-administered medications for this visit.       Review of patient's allergies indicates:  No Known Allergies    Past Medical History:   Diagnosis Date    Hypertension        Past Surgical History:   Procedure Laterality Date     SECTION      NECK MASS EXCISION      pph hemorrhoidectomy         Family History   Problem Relation Age of Onset    Hypertension Paternal Grandmother        Social History     Socioeconomic History    Marital status:    Tobacco Use    Smoking status: Never    Smokeless tobacco: Never   Substance and Sexual Activity    Alcohol use: Not Currently     Comment: social    Drug use: No    Sexual activity: Not Currently       Vitals:    22 1123   BP: 110/79   Pulse: 93       Physical Exam  Constitutional:       Appearance: She is well-developed.   HENT:      Head: Normocephalic and atraumatic.   Eyes:      Pupils: Pupils are equal, round, and reactive to light.   Cardiovascular:      Rate and Rhythm: Normal rate and regular rhythm.      Heart sounds: No murmur heard.  Pulmonary:      Effort: Pulmonary effort is normal.      Breath sounds: Normal breath sounds. No wheezing.   Abdominal:      General: There is no distension.      Palpations: Abdomen is soft.      Tenderness: There is no abdominal tenderness.   Musculoskeletal:         General: Normal range of motion.      Cervical back: Normal range of motion and neck supple.   Skin:     General: Skin is warm and dry.      Capillary Refill: Capillary refill takes less than 2 seconds.      Findings: No rash.   Neurological:      Mental Status: She is alert and oriented to person, place, and time.   Psychiatric:         Judgment: Judgment normal.       Assessment & Plan:   Cholecystitis plan lap mamadou     Risks, benefits, alternatives to the  procedure were discussed with the patient, who appears to understand and agree with the treatment plan.

## 2022-09-26 ENCOUNTER — TELEPHONE (OUTPATIENT)
Dept: SURGERY | Facility: CLINIC | Age: 57
End: 2022-09-26
Payer: COMMERCIAL

## 2022-09-26 NOTE — TELEPHONE ENCOUNTER
Attempted to contact , regarding msg, left a brief VM informing her we did receive her FMLA paperwork.    ----- Message from Ifeoma Beyer sent at 9/26/2022  3:06 PM CDT -----  .Type: Patient Call Back    Who called:self    What is the request in detail: pt would like to confirm that FMLA form that consist of 4 pages was received by the office. Please leave a detailed message if patient does not answer.    Can the clinic reply by [facility]?    Would the patient rather a call back or a response via [facility]? call    Best call back number:.[phone] (home)

## 2022-09-28 ENCOUNTER — HOSPITAL ENCOUNTER (OUTPATIENT)
Dept: PREADMISSION TESTING | Facility: HOSPITAL | Age: 57
Discharge: HOME | End: 2022-09-28
Attending: SURGERY
Payer: COMMERCIAL

## 2022-09-28 VITALS
WEIGHT: 234.38 LBS | SYSTOLIC BLOOD PRESSURE: 101 MMHG | OXYGEN SATURATION: 99 % | BODY MASS INDEX: 39.05 KG/M2 | HEIGHT: 65 IN | TEMPERATURE: 98 F | RESPIRATION RATE: 18 BRPM | HEART RATE: 74 BPM | DIASTOLIC BLOOD PRESSURE: 70 MMHG

## 2022-09-28 DIAGNOSIS — Z01.818 PREOPERATIVE TESTING: Primary | ICD-10-CM

## 2022-09-28 RX ORDER — BUSPIRONE HYDROCHLORIDE 5 MG/1
5 TABLET ORAL 3 TIMES DAILY PRN
COMMUNITY
Start: 2022-06-23

## 2022-09-28 RX ORDER — BUMETANIDE 1 MG/1
1 TABLET ORAL DAILY
COMMUNITY
Start: 2022-08-24

## 2022-09-28 RX ORDER — ORAL SEMAGLUTIDE 14 MG/1
14 TABLET ORAL DAILY
COMMUNITY
Start: 2022-09-06

## 2022-09-28 RX ORDER — ROSUVASTATIN CALCIUM 20 MG/1
20 TABLET, COATED ORAL DAILY
COMMUNITY
Start: 2022-09-06

## 2022-09-28 RX ORDER — ATENOLOL 100 MG/1
100 TABLET ORAL
COMMUNITY

## 2022-09-28 NOTE — DISCHARGE INSTRUCTIONS
Before 7 AM, enter through the Emergency Entrance..   After 7 AM enter through the Main Entrance.      Your procedure  is scheduled for __10/3/2022________.    Call [phone] between 2pm and 5pm on __9/30/2022_____to find out your arrival time for the day of surgery.    You may use the main entrance to the hospital on the [facility] side, or the entrance that is next to the [facility].    You may have one visitor.  No children allowed.     You will be going to the Same Day Surgery Unit on the 2nd floor of the hospital.    Important instructions:  Do not eat anything after midnight.  You may have plain water, non carbonated.  You may also have Gatorade or Powerade after midnight.    Stop all fluids 2 hours before your surgery.    It is okay to brush your teeth.  Do not have gum, candy or mints.    SEE MEDICATION SHEET.   TAKE MEDICATIONS AS DIRECTED WITH SIPS OF WATER.      Do not take any diabetic medication on the morning of surgery unless instructed to do so by your doctor or pre op nurse.    STOP taking Aspirin, Ibuprofen,  Advil, Motrin, Mobic(meloxicam), Aleve (naproxen), Fish oil, and Vitamin E for at least 7 days before your surgery.     You may take Tylenol if needed which is not a blood thinner.    Please shower the night before and the morning of your surgery.      Use Hibiclens soap as instructed by your pre op nurse.   Please place clean linens on your bed the night before surgery. Please wear fresh clean clothing after each shower.    No shaving of procedural area at least 4-5 days before surgery due to increased risk of skin irritation and/or possible infection.    Contact lenses and removable denture work may not be worn during your procedure.    You may wear deodorant only. If you are having breast surgery, do not wear deodorant on the operative side.    Do not wear powder, body lotion, perfume/cologne or make-up.    Do not wear any jewelry or have any metal on your body.    You will be  asked to remove any dentures or partials for the procedure.    If you are going home on the same day of surgery, you must arrange for a family member or a friend to drive you home.  Public transportation is prohibited.  You will not be able to drive home if you were given anesthesia or sedation.    Patients who want to have their Post-op prescriptions filled from our in-house [facility], bring a Credit/Debit Card or cash with you. A co-pay may be required.  The pharmacy closes at 5:30 pm.    Wear loose fitting clothes allowing for bandages.    Please leave money and valuables home.      You may bring your cell phone.    Call the doctor if fever or illness should occur before your surgery.    Call [phone] to contact us here if needed.

## 2022-09-28 NOTE — ANESTHESIA PREPROCEDURE EVALUATION
2022  Kelli Rosas is a 56 y.o., female scheduled for  CHOLECYSTECTOMY, LAPAROSCOPIC (Abdomen) on 10/3/2022.    NPO >8  METS >4    Vitals:    10/03/22 0615   BP: 107/63   Pulse: 89   Resp: 16   Temp: 36.8 °C (98.2 °F)         Past Medical History:   Diagnosis Date    Diabetes mellitus     Hypertension     Obesity, unspecified        Past Surgical History:   Procedure Laterality Date    ABDOMINAL SURGERY       SECTION       SECTION      HERNIA REPAIR      NECK MASS EXCISION      pph hemorrhoidectomy       No results found for: WBC, HGB, HCT, MCV, PLT    CMP  22  Sodium 136 - 145 mmol/L 136     Potassium 3.5 - 5.1 mmol/L 3.7     Chloride 98 - 107 mmol/L 103     Carbon Dioxide 21 - 32 mmol/L 25     Glucose 65 - 99 mg/dL 154 High      BUN 7.0 - 18.0 mg/dL 17.6     Creatinine 0.55 - 1.02 mg/dL 1.12 High      BUN/Creatinine Ratio  16     Calcium 8.5 - 10.1 mg/dL 8.6     Total Protein 6.4 - 8.2 g/dL 7.3     Albumin 3.4 - 5.0 g/dL 3.5     Albumin/Globulin Ratio  0.9     AST 15 - 37 U/L 54 High      ALT 13 - 56 U/L 37     Alkaline Phosphatase 45 - 117 U/L 72     Bilirubin, Total 0.2 - 1.0 mg/dL 0.4        CBC  WBC 4.5 - 11.0 103/uL 7.7    RBC 3.50 - 5.50 106/uL 4.16    Hemoglobin 12.0 - 16.0 gm/dL 11.2 Low     Hematocrit 36.0 - 51.0 % 34.8 Low     MCV 86.0 - 98.0 fL 83.7 Low     MCH 25.4 - 34.6 pg 26.9    MCHC 32.5 - 35.5 g/dL 32.2 Low     RDW 12.0 - 15.0 % 14.3    Platelet Count 140 - 440 103/uL 163        EKG  Vent. Rate : 077 BPM     Atrial Rate : 077 BPM      P-R Int : 136 ms          QRS Dur : 088 ms       QT Int : 374 ms       P-R-T Axes : 017 014 057 degrees      QTc Int : 423 ms     Normal sinus rhythm   Septal infarct ,age undetermined   Abnormal ECG   No previous ECGs available   Confirmed by Anselmo Ramsey MD ([id number]) on 2022 4:18:22 PM   Pre-op  Assessment    I have reviewed the Patient Summary Reports.     I have reviewed the Nursing Notes. I have reviewed the NPO Status.   I have reviewed the Medications.     Review of Systems  Anesthesia Hx:  No problems with previous Anesthesia  History of prior surgery of interest to airway management or planning: Denies Family Hx of Anesthesia complications.  Personal Hx of Anesthesia complications, Post-Operative Nausea/Vomiting, with every anesthetic, treatment not known   Social:  Non-Smoker, No Alcohol Use    Hematology/Oncology:  Hematology Normal   Oncology Normal     EENT/Dental:EENT/Dental Normal   Cardiovascular:   Exercise tolerance: good Hypertension hyperlipidemia  Functional Capacity good / => 4 METS    Pulmonary:  Pulmonary Normal    Renal/:  Renal/ Normal     Hepatic/GI:   GERD    Musculoskeletal:  Musculoskeletal Normal    Neurological:  Neurology Normal    Endocrine:   Diabetes, type 2  Obesity / BMI > 30  Dermatological:  Skin Normal    Psych:  Psychiatric Normal           Physical Exam  General: Cooperative, Alert and Oriented    Airway:  Mallampati: III   Mouth Opening: Normal  TM Distance: 4 - 6 cm      Dental:  Intact        Anesthesia Plan  Type of Anesthesia, risks & benefits discussed:    Anesthesia Type: Gen ETT  Intra-op Monitoring Plan: Standard ASA Monitors  Post Op Pain Control Plan: multimodal analgesia  Induction:  IV  Airway Plan: Video, Post-Induction  Informed Consent: Informed consent signed with the Patient and all parties understand the risks and agree with anesthesia plan.  All questions answered. Patient consented to blood products? Yes  ASA Score: 2  Day of Surgery Review of History & Physical: H&P Update referred to the surgeon/provider.  Anesthesia Plan Notes: Scopalomine, decadron, zofran    Ready For Surgery From Anesthesia Perspective.     .

## 2022-10-03 ENCOUNTER — HOSPITAL ENCOUNTER (OUTPATIENT)
Facility: HOSPITAL | Age: 57
Discharge: HOME | End: 2022-10-03
Attending: SURGERY | Admitting: SURGERY
Payer: COMMERCIAL

## 2022-10-03 ENCOUNTER — ANESTHESIA (OUTPATIENT)
Dept: SURGERY | Facility: HOSPITAL | Age: 57
End: 2022-10-03
Payer: COMMERCIAL

## 2022-10-03 VITALS
OXYGEN SATURATION: 95 % | TEMPERATURE: 98 F | SYSTOLIC BLOOD PRESSURE: 129 MMHG | RESPIRATION RATE: 18 BRPM | BODY MASS INDEX: 38.99 KG/M2 | WEIGHT: 234.31 LBS | DIASTOLIC BLOOD PRESSURE: 71 MMHG | HEART RATE: 77 BPM

## 2022-10-03 DIAGNOSIS — K80.10 CHOLECYSTITIS WITH CHOLELITHIASIS: Primary | ICD-10-CM

## 2022-10-03 DIAGNOSIS — K81.9 CHOLECYSTITIS: ICD-10-CM

## 2022-10-03 LAB
POCT GLUCOSE: 128 MG/DL (ref 70–110)
POCT GLUCOSE: 192 MG/DL (ref 70–110)

## 2022-10-03 PROCEDURE — 36000708 HC OR TIME LEV III 1ST 15 MIN: Performed by: SURGERY

## 2022-10-03 PROCEDURE — 27201423 OPTIME MED/SURG SUP & DEVICES STERILE SUPPLY: Performed by: SURGERY

## 2022-10-03 PROCEDURE — 88304 TISSUE EXAM BY PATHOLOGIST: CPT | Mod: 26,,, | Performed by: PATHOLOGY

## 2022-10-03 PROCEDURE — 88304 PR  SURG PATH,LEVEL III: ICD-10-PCS | Mod: 26,,, | Performed by: PATHOLOGY

## 2022-10-03 PROCEDURE — 63600175 PHARM REV CODE 636 W HCPCS: Performed by: ANESTHESIOLOGY

## 2022-10-03 PROCEDURE — 25000003 PHARM REV CODE 250: Performed by: NURSE ANESTHETIST, CERTIFIED REGISTERED

## 2022-10-03 PROCEDURE — 63600175 PHARM REV CODE 636 W HCPCS: Performed by: NURSE ANESTHETIST, CERTIFIED REGISTERED

## 2022-10-03 PROCEDURE — 63600175 PHARM REV CODE 636 W HCPCS: Performed by: SURGERY

## 2022-10-03 PROCEDURE — 88304 TISSUE EXAM BY PATHOLOGIST: CPT | Performed by: PATHOLOGY

## 2022-10-03 PROCEDURE — 25000003 PHARM REV CODE 250: Performed by: SURGERY

## 2022-10-03 PROCEDURE — 37000008 HC ANESTHESIA 1ST 15 MINUTES: Performed by: SURGERY

## 2022-10-03 PROCEDURE — 71000039 HC RECOVERY, EACH ADD'L HOUR: Performed by: SURGERY

## 2022-10-03 PROCEDURE — 47562 PR LAP,CHOLECYSTECTOMY: ICD-10-PCS | Mod: ,,, | Performed by: SURGERY

## 2022-10-03 PROCEDURE — D9220A PRA ANESTHESIA: ICD-10-PCS | Mod: ANES,,, | Performed by: ANESTHESIOLOGY

## 2022-10-03 PROCEDURE — 71000016 HC POSTOP RECOV ADDL HR: Performed by: SURGERY

## 2022-10-03 PROCEDURE — D9220A PRA ANESTHESIA: ICD-10-PCS | Mod: CRNA,,, | Performed by: NURSE ANESTHETIST, CERTIFIED REGISTERED

## 2022-10-03 PROCEDURE — 37000009 HC ANESTHESIA EA ADD 15 MINS: Performed by: SURGERY

## 2022-10-03 PROCEDURE — 25000003 PHARM REV CODE 250: Performed by: ANESTHESIOLOGY

## 2022-10-03 PROCEDURE — 82962 GLUCOSE BLOOD TEST: CPT | Performed by: SURGERY

## 2022-10-03 PROCEDURE — 36000709 HC OR TIME LEV III EA ADD 15 MIN: Performed by: SURGERY

## 2022-10-03 PROCEDURE — 47562 LAPAROSCOPIC CHOLECYSTECTOMY: CPT | Mod: ,,, | Performed by: SURGERY

## 2022-10-03 PROCEDURE — D9220A PRA ANESTHESIA: Mod: CRNA,,, | Performed by: NURSE ANESTHETIST, CERTIFIED REGISTERED

## 2022-10-03 PROCEDURE — 71000033 HC RECOVERY, INTIAL HOUR: Performed by: SURGERY

## 2022-10-03 PROCEDURE — D9220A PRA ANESTHESIA: Mod: ANES,,, | Performed by: ANESTHESIOLOGY

## 2022-10-03 PROCEDURE — 71000015 HC POSTOP RECOV 1ST HR: Performed by: SURGERY

## 2022-10-03 RX ORDER — DEXAMETHASONE SODIUM PHOSPHATE 4 MG/ML
INJECTION, SOLUTION INTRA-ARTICULAR; INTRALESIONAL; INTRAMUSCULAR; INTRAVENOUS; SOFT TISSUE
Status: DISCONTINUED | OUTPATIENT
Start: 2022-10-03 | End: 2022-10-03

## 2022-10-03 RX ORDER — ONDANSETRON 2 MG/ML
INJECTION INTRAMUSCULAR; INTRAVENOUS
Status: DISCONTINUED | OUTPATIENT
Start: 2022-10-03 | End: 2022-10-03

## 2022-10-03 RX ORDER — SODIUM CHLORIDE 9 MG/ML
INJECTION, SOLUTION INTRAVENOUS CONTINUOUS
Status: DISCONTINUED | OUTPATIENT
Start: 2022-10-03 | End: 2022-10-03 | Stop reason: HOSPADM

## 2022-10-03 RX ORDER — PROPOFOL 10 MG/ML
VIAL (ML) INTRAVENOUS
Status: DISCONTINUED | OUTPATIENT
Start: 2022-10-03 | End: 2022-10-03

## 2022-10-03 RX ORDER — KETOROLAC TROMETHAMINE 30 MG/ML
15 INJECTION, SOLUTION INTRAMUSCULAR; INTRAVENOUS EVERY 8 HOURS PRN
Status: DISCONTINUED | OUTPATIENT
Start: 2022-10-03 | End: 2022-10-03 | Stop reason: HOSPADM

## 2022-10-03 RX ORDER — OXYCODONE HYDROCHLORIDE 5 MG/1
5 TABLET ORAL ONCE
Status: COMPLETED | OUTPATIENT
Start: 2022-10-03 | End: 2022-10-03

## 2022-10-03 RX ORDER — LIDOCAINE HYDROCHLORIDE 10 MG/ML
1 INJECTION, SOLUTION EPIDURAL; INFILTRATION; INTRACAUDAL; PERINEURAL ONCE
Status: DISCONTINUED | OUTPATIENT
Start: 2022-10-03 | End: 2022-10-03 | Stop reason: HOSPADM

## 2022-10-03 RX ORDER — PHENYLEPHRINE HYDROCHLORIDE 10 MG/ML
INJECTION INTRAVENOUS
Status: DISCONTINUED | OUTPATIENT
Start: 2022-10-03 | End: 2022-10-03

## 2022-10-03 RX ORDER — OXYCODONE HYDROCHLORIDE 5 MG/1
5 TABLET ORAL EVERY 6 HOURS PRN
Qty: 16 TABLET | Refills: 0 | Status: SHIPPED | OUTPATIENT
Start: 2022-10-03 | End: 2022-10-04

## 2022-10-03 RX ORDER — HALOPERIDOL 5 MG/ML
0.5 INJECTION INTRAMUSCULAR EVERY 10 MIN PRN
Status: DISCONTINUED | OUTPATIENT
Start: 2022-10-03 | End: 2022-10-03 | Stop reason: HOSPADM

## 2022-10-03 RX ORDER — LIDOCAINE HYDROCHLORIDE 20 MG/ML
INJECTION INTRAVENOUS
Status: DISCONTINUED | OUTPATIENT
Start: 2022-10-03 | End: 2022-10-03

## 2022-10-03 RX ORDER — CEFAZOLIN SODIUM 1 G/50ML
2 SOLUTION INTRAVENOUS
Status: COMPLETED | OUTPATIENT
Start: 2022-10-03 | End: 2022-10-03

## 2022-10-03 RX ORDER — ROCURONIUM BROMIDE 10 MG/ML
INJECTION, SOLUTION INTRAVENOUS
Status: DISCONTINUED | OUTPATIENT
Start: 2022-10-03 | End: 2022-10-03

## 2022-10-03 RX ORDER — MIDAZOLAM HYDROCHLORIDE 1 MG/ML
INJECTION, SOLUTION INTRAMUSCULAR; INTRAVENOUS
Status: DISCONTINUED | OUTPATIENT
Start: 2022-10-03 | End: 2022-10-03

## 2022-10-03 RX ORDER — SCOLOPAMINE TRANSDERMAL SYSTEM 1 MG/1
PATCH, EXTENDED RELEASE TRANSDERMAL
Status: DISCONTINUED | OUTPATIENT
Start: 2022-10-03 | End: 2022-10-03

## 2022-10-03 RX ORDER — INDOCYANINE GREEN AND WATER 25 MG
5 KIT INJECTION
Status: COMPLETED | OUTPATIENT
Start: 2022-10-03 | End: 2022-10-03

## 2022-10-03 RX ORDER — ONDANSETRON 2 MG/ML
4 INJECTION INTRAMUSCULAR; INTRAVENOUS ONCE
Status: COMPLETED | OUTPATIENT
Start: 2022-10-03 | End: 2022-10-03

## 2022-10-03 RX ORDER — FENTANYL CITRATE 50 UG/ML
INJECTION, SOLUTION INTRAMUSCULAR; INTRAVENOUS
Status: DISCONTINUED | OUTPATIENT
Start: 2022-10-03 | End: 2022-10-03

## 2022-10-03 RX ORDER — HYDROMORPHONE HYDROCHLORIDE 2 MG/ML
0.2 INJECTION, SOLUTION INTRAMUSCULAR; INTRAVENOUS; SUBCUTANEOUS EVERY 5 MIN PRN
Status: COMPLETED | OUTPATIENT
Start: 2022-10-03 | End: 2022-10-03

## 2022-10-03 RX ORDER — SODIUM CHLORIDE 0.9 % (FLUSH) 0.9 %
10 SYRINGE (ML) INJECTION
Status: DISCONTINUED | OUTPATIENT
Start: 2022-10-03 | End: 2022-10-03 | Stop reason: HOSPADM

## 2022-10-03 RX ORDER — DOCUSATE SODIUM 100 MG/1
100 CAPSULE, LIQUID FILLED ORAL 2 TIMES DAILY
COMMUNITY
End: 2022-10-06

## 2022-10-03 RX ADMIN — HYDROMORPHONE HYDROCHLORIDE 0.2 MG: 2 INJECTION INTRAMUSCULAR; INTRAVENOUS; SUBCUTANEOUS at 08:10

## 2022-10-03 RX ADMIN — FENTANYL CITRATE 100 MCG: 50 INJECTION, SOLUTION INTRAMUSCULAR; INTRAVENOUS at 07:10

## 2022-10-03 RX ADMIN — INDOCYANINE GREEN AND WATER 2.5 MG: KIT at 07:10

## 2022-10-03 RX ADMIN — HYDROMORPHONE HYDROCHLORIDE 0.2 MG: 2 INJECTION INTRAMUSCULAR; INTRAVENOUS; SUBCUTANEOUS at 09:10

## 2022-10-03 RX ADMIN — ONDANSETRON 4 MG: 2 INJECTION, SOLUTION INTRAMUSCULAR; INTRAVENOUS at 08:10

## 2022-10-03 RX ADMIN — PROPOFOL 140 MG: 10 INJECTION, EMULSION INTRAVENOUS at 07:10

## 2022-10-03 RX ADMIN — FENTANYL CITRATE 50 MCG: 50 INJECTION, SOLUTION INTRAMUSCULAR; INTRAVENOUS at 08:10

## 2022-10-03 RX ADMIN — MIDAZOLAM HYDROCHLORIDE 2 MG: 1 INJECTION, SOLUTION INTRAMUSCULAR; INTRAVENOUS at 07:10

## 2022-10-03 RX ADMIN — PHENYLEPHRINE HYDROCHLORIDE 100 MCG: 10 INJECTION INTRAVENOUS at 07:10

## 2022-10-03 RX ADMIN — OXYCODONE 5 MG: 5 TABLET ORAL at 11:10

## 2022-10-03 RX ADMIN — PHENYLEPHRINE HYDROCHLORIDE 200 MCG: 10 INJECTION INTRAVENOUS at 07:10

## 2022-10-03 RX ADMIN — ROCURONIUM BROMIDE 50 MG: 10 INJECTION, SOLUTION INTRAVENOUS at 07:10

## 2022-10-03 RX ADMIN — LIDOCAINE HYDROCHLORIDE 100 MG: 20 INJECTION, SOLUTION INTRAVENOUS at 07:10

## 2022-10-03 RX ADMIN — SCOPOLAMINE 1 PATCH: 1 PATCH, EXTENDED RELEASE TRANSDERMAL at 07:10

## 2022-10-03 RX ADMIN — SUGAMMADEX 200 MG: 100 INJECTION, SOLUTION INTRAVENOUS at 08:10

## 2022-10-03 RX ADMIN — DEXAMETHASONE SODIUM PHOSPHATE 4 MG: 4 INJECTION, SOLUTION INTRAMUSCULAR; INTRAVENOUS at 07:10

## 2022-10-03 RX ADMIN — CEFAZOLIN SODIUM 2 G: 1 SOLUTION INTRAVENOUS at 07:10

## 2022-10-03 RX ADMIN — SODIUM CHLORIDE, SODIUM LACTATE, POTASSIUM CHLORIDE, AND CALCIUM CHLORIDE: .6; .31; .03; .02 INJECTION, SOLUTION INTRAVENOUS at 07:10

## 2022-10-03 RX ADMIN — ONDANSETRON 4 MG: 2 INJECTION INTRAMUSCULAR; INTRAVENOUS at 11:10

## 2022-10-03 RX ADMIN — KETOROLAC TROMETHAMINE 15 MG: 30 INJECTION, SOLUTION INTRAMUSCULAR at 09:10

## 2022-10-03 NOTE — ANESTHESIA PROCEDURE NOTES
Intubation    Date/Time: 10/3/2022 7:26 AM  Performed by: Hay Cuellar CRNA  Authorized by: Trina Benedict MD     Intubation:     Induction:  Inhalational - mask    Intubated:  Postinduction    Mask Ventilation:  Easy mask    Attempts:  1    Attempted By:  CRNA    Method of Intubation:  Video laryngoscopy and direct    Blade:  Santiago 3    Laryngeal View Grade: Grade IIA - cords partially seen      Difficult Airway Encountered?: No      Complications:  None    Airway Device:  Oral endotracheal tube    Airway Device Size:  7.0    Style/Cuff Inflation:  Cuffed    Inflation Amount (mL):  5    Tube secured:  20    Secured at:  The teeth    Placement Verified By:  Capnometry    Complicating Factors:  None    Findings Post-Intubation:  BS equal bilateral

## 2022-10-03 NOTE — OP NOTE
.Laparoscopic Cholecystectomy Procedure Note    Pre-operative Diagnosis: Calculus of gallbladder with other cholecystitis, without mention of obstruction    Post-operative Diagnosis: Same    Surgery Date: 10/3/2022     Surgeon: Henrique Ulloa     Assistants: Dr. Virk    Anesthesia: General endotracheal anesthesia    Indications: This patient presents with symptomatic gallbladder disease and will undergo laparoscopic cholecystectomy.    The patient was seen again in the Holding Room. The risks and benefits, including but not limited to common bile duct injury, cystic duct stump leak, bleeding and infection were explained to the patient, who acknowledges these risks and wishes to proceed.    Procedure Details   The patient was taken to Operating Room, identified as Kelli Rosas and the procedure verified as Laparoscopic Cholecystectomy. A Time Out was held and the above information confirmed.    Prior to the induction of general anesthesia, antibiotic prophylaxis was administered. General endotracheal anesthesia was then administered and tolerated well. After the induction, the abdomen was prepped in the usual sterile fashion. The patient was positioned in the supine position with the arms extended.    A vertical mid-line incision was made and carried down to the fascia.  The fascia was incised sharply, and hernia mesh was encountered. An alternate entry approach was performed by optiview direct visualization.  A 30 degree, 5 mm scope was then placed through the trocar and the abdominal cavity was explored.    The underlying bowel was inspected and found to be free of injury.  The remaining ports were placed under direct visualization including a 10 mm umbilical port. Subxiphoid port and right lower port to visualize the gallbladder.  Adhesions were present in midline preventing typical visualization.      The gallbladder was grasped and retracted cranially and laterally exposing the triangle of Calot.   Dissection began on the neck of the gallbladder and continued proximally until a ductal structure was identified.  A critical anterior and posterior view of the duct was obtained to confirm it was the cystic duct.  It was then doubly clipped and transected.  Continued dissection demonstrated the cystic artery. It was similarly clipped and transected.  The gallbladder was then dissected from the liver bed using electrocautery and removed through the umbilical trocar. The liver was cobblestone appearance with may represent early cirrhosis.  Powdered surgicell was placed in the liver bed. The dissection area was then inspected for hemostasis which was evident.  The dissection area was also inspected for a bile leak and bowel injury which was not evident.    The pneumoperitoneum was then expelled from the abdomen, and all ports were removed.  The umbilical fascia was reapproximated with a single figure-of-eight 0 vicryl suture.  The skin over all incisions were closed using 4-0 Vicryl deep dermal sutures.  A sterile dressing was applied.    Instrument, sponge, and needle counts were correct at closure and at the conclusion of the case.  There were no immediate complications.    Findings:  Cholecystitis with Cholelithiasis    Estimated Blood Loss: Minimal           Drains: none           Total IV Fluids: see anesthesia records           Specimens: Gallbladder             Complications: None; patient tolerated the procedure well.           Disposition: PACU - hemodynamically stable.           Condition: stable

## 2022-10-03 NOTE — BRIEF OP NOTE
[facility] - Surgery  Brief Operative Note    Surgery Date: 10/3/2022     Surgeon(s) and Role:     * Suresh Ulloa MD - Primary    Assisting Surgeon: Maico Virk MD    Pre-op Diagnosis:  Cholecystitis [K81.9]    Post-op Diagnosis:  Post-Op Diagnosis Codes:     * Cholecystitis [K81.9]    Procedure(s) (LRB):  CHOLECYSTECTOMY, LAPAROSCOPIC (N/A)    Anesthesia: General    Operative Findings: Moderately inflamed gallbladder with cholelithiasis appreciated. Significant adhesive disease from previous operations.     Estimated Blood Loss: 50 mL         Specimens:   Specimen (24h ago, onward)       Start     Ordered    10/03/22 0828  Specimen to Pathology, Surgery General Surgery  Once        Comments: Pre-op Diagnosis: Cholecystitis [K81.9]Procedure(s):CHOLECYSTECTOMY, LAPAROSCOPIC Number of specimens: 1Name of specimens: Gallbladder     References:    Click here for ordering Quick Tip   Question Answer Comment   Procedure Type: General Surgery    Which provider would you like to cc? SURESH ULLOA    Release to patient Immediate        10/03/22 0828                      Discharge Note    OUTCOME: Patient tolerated treatment/procedure well without complication and is now ready for discharge.    DISPOSITION: Home or Self Care    FINAL DIAGNOSIS:  Cholecystitis with cholelithiasis    FOLLOWUP: In clinic    DISCHARGE INSTRUCTIONS:    Discharge Procedure Orders   Diet Adult Regular     Lifting restrictions   Order Comments: No lifting greater than 10 pounds for 6 weeks from day of surgery.  No pushing/pulling such as vacuuming or raking.  No straining, avoid constipation and take stool softeners as described and laxatives as needed.  No driving while on narcotics and until you can react quickly without pain.     Other restrictions (specify):   Order Comments: Post-op Instructions  Please take pain medication as needed, if taking narcotics please avoid driving.    We recommend a high fiber diet and use of stool  softeners while on narcotics as they might cause constipation.    You may leave the dressing in place for 48 hours.   Okay to shower in 24 hours   Avoid swimming pools, baths or hot tubs for at least 2 weeks.   Do not lift anything heavier than 10 lb for at least 2 weeks.   Please call if you have fevers, chills, shortness of breath, increased drainage from your wound or bleeding.  Please call to make an appointment in 2 weeks for wound recheck.     No driving until:   Order Comments: Off of narcotic pain medication, physically able to perform motions necessary for operating motor vehicle     Notify your health care provider if you experience any of the following:  temperature >100.4     Notify your health care provider if you experience any of the following:  persistent nausea and vomiting or diarrhea     Notify your health care provider if you experience any of the following:  severe uncontrolled pain     Notify your health care provider if you experience any of the following:  redness, tenderness, or signs of infection (pain, swelling, redness, odor or green/yellow discharge around incision site)     Notify your health care provider if you experience any of the following:  difficulty breathing or increased cough     Notify your health care provider if you experience any of the following:  severe persistent headache     Remove dressing in 48 hours     Activity as tolerated

## 2022-10-03 NOTE — ANESTHESIA POSTPROCEDURE EVALUATION
Anesthesia Post Evaluation    Patient: Kelli Rosas    Procedure(s) Performed: Procedure(s) (LRB):  CHOLECYSTECTOMY, LAPAROSCOPIC (N/A)    Final Anesthesia Type: general      Patient location during evaluation: PACU  Patient participation: Yes- Able to Participate  Level of consciousness: awake and alert  Post-procedure vital signs: reviewed and stable  Pain management: adequate  Airway patency: patent  TIM mitigation strategies: Multimodal analgesia and Extubation while patient is awake  PONV status at discharge: No PONV  Anesthetic complications: no      Cardiovascular status: blood pressure returned to baseline  Respiratory status: unassisted and spontaneous ventilation  Hydration status: euvolemic  Follow-up not needed.          Vitals Value Taken Time   /69 10/03/22 0903   Temp 36.8 °C (98.3 °F) 10/03/22 0857   Pulse 89 10/03/22 0910   Resp 18 10/03/22 0910   SpO2 98 % 10/03/22 0910   Vitals shown include unvalidated device data.      No case tracking events are documented in the log.      Pain/Lexus Score: Pain Rating Prior to Med Admin: 6 (10/3/2022  9:09 AM)  Lexus Score: 9 (10/3/2022  8:52 AM)

## 2022-10-03 NOTE — TRANSFER OF CARE
Anesthesia Transfer of Care Note    Patient: Kelli Rosas    Procedure(s) Performed: Procedure(s) (LRB):  CHOLECYSTECTOMY, LAPAROSCOPIC (N/A)    Patient location: PACU    Transport from OR: Transported from OR on 6-10 L/min O2 by face mask with adequate spontaneous ventilation    Post pain: adequate analgesia    Post assessment: no apparent anesthetic complications and tolerated procedure well    Post vital signs: stable    Level of consciousness: awake, alert and oriented    Nausea/Vomiting: no nausea/vomiting    Complications: none    Transfer of care protocol was followed      Last vitals:   Visit Vitals  BP (!) 140/78   Pulse 92   Temp 36.8 °C (98.3 °F) (Temporal)   Resp 15   Wt 106.3 kg (234 lb 5 oz)   SpO2 98%   Breastfeeding No   BMI 38.99 kg/m²

## 2022-10-04 ENCOUNTER — TELEPHONE (OUTPATIENT)
Dept: SURGERY | Facility: CLINIC | Age: 57
End: 2022-10-04
Payer: COMMERCIAL

## 2022-10-04 LAB
FINAL PATHOLOGIC DIAGNOSIS: NORMAL
GROSS: NORMAL
Lab: NORMAL

## 2022-10-04 RX ORDER — HYDROCODONE BITARTRATE AND ACETAMINOPHEN 5; 325 MG/1; MG/1
1 TABLET ORAL EVERY 6 HOURS PRN
Qty: 25 TABLET | Refills: 0 | Status: SHIPPED | OUTPATIENT
Start: 2022-10-04 | End: 2022-10-06 | Stop reason: DRUGHIGH

## 2022-10-04 NOTE — TELEPHONE ENCOUNTER
Spoke to patient, informed medication will be sent to pharmacy requested.      Patient states verbal understanding,

## 2022-10-04 NOTE — TELEPHONE ENCOUNTER
----- Message from Grace Cunha sent at 10/4/2022 11:21 AM CDT -----  Regarding: Patient call back  Who called:ENA PHILLIPS KEILY [[id number]]    What is the request in detail: Patient is requesting a call back. She states her oxyCODONE (ROXICODONE) 5 MG immediate release tablet is not helping with her pain. She would like to know if it can be changed to hydrocodone. She would like to further discuss. She would like it sent to [facility] [place] [id number] pg. [phone]  Please advise.    Can the clinic reply by [facility]? No    Best call back number:[phone]    Additional Information: N/A

## 2022-10-05 ENCOUNTER — NURSE TRIAGE (OUTPATIENT)
Dept: ADMINISTRATIVE | Facility: CLINIC | Age: 57
End: 2022-10-05
Payer: COMMERCIAL

## 2022-10-05 PROCEDURE — 87502 INFLUENZA DNA AMP PROBE: CPT

## 2022-10-05 PROCEDURE — 99285 EMERGENCY DEPT VISIT HI MDM: CPT | Mod: 25

## 2022-10-05 PROCEDURE — 96376 TX/PRO/DX INJ SAME DRUG ADON: CPT

## 2022-10-05 PROCEDURE — 96375 TX/PRO/DX INJ NEW DRUG ADDON: CPT

## 2022-10-05 NOTE — TELEPHONE ENCOUNTER
"OOC outgoing call    Pt c/o  had gb sx on Monday, eye are yellow and blood shot. Jaundice protocol followed and pt advised to see her pcp within 24 hours. Pt said [place] pcp [place]. Encounter routed to sx Henrique Ulloa. Pt said she wishes to go to the ER instead. Pt said her urine was a dark yellow and wishes to receive IVF.  Encounter routed  sx Dr Henrique Ulloa as high priority. Invited Pt to call ooc at [phone] if she has any future questions or concerns. Pt said her pcp is not an [facility] doctor.   Reason for Disposition   Jaundice    Additional Information   Negative: Unconscious or difficult to awaken   Negative: Acting confused (e.g., disoriented, slurred speech)   Negative: Seizure has occurred   Negative: Has fainted (passed out)   Negative: Very weak (e.g., can't stand)   Negative: Acetaminophen overdose or poisoning suspected   Negative: Sounds like a life-threatening emergency to the triager   Negative: [1] Abdominal pain AND [2] severe   Negative: [1] Constant abdominal pain AND [2] present > 2 hours   Negative: [1] Vomiting AND [2] contains red blood or black ("coffee ground") material   Negative: [1] Vomiting AND [2] signs of dehydration (e.g., very dry mouth, lightheaded)   Negative: Fever   Negative: Shaking chills   Negative: [1] Drinking very little AND [2] dehydration suspected (e.g., no urine > 12 hours, very dry mouth, very lightheaded)   Negative: Patient sounds very sick or weak to the triager   Negative: Abdominal pain    Protocols used: [id number]-GY    "

## 2022-10-06 ENCOUNTER — HOSPITAL ENCOUNTER (OUTPATIENT)
Facility: HOSPITAL | Age: 57
Discharge: HOME | End: 2022-10-06
Attending: EMERGENCY MEDICINE | Admitting: SURGERY
Payer: COMMERCIAL

## 2022-10-06 VITALS
DIASTOLIC BLOOD PRESSURE: 57 MMHG | TEMPERATURE: 98 F | RESPIRATION RATE: 22 BRPM | WEIGHT: 230 LBS | SYSTOLIC BLOOD PRESSURE: 107 MMHG | HEIGHT: 65 IN | OXYGEN SATURATION: 96 % | BODY MASS INDEX: 38.32 KG/M2 | HEART RATE: 91 BPM

## 2022-10-06 DIAGNOSIS — R10.11 RIGHT UPPER QUADRANT ABDOMINAL PAIN: ICD-10-CM

## 2022-10-06 DIAGNOSIS — R07.9 CHEST PAIN: ICD-10-CM

## 2022-10-06 DIAGNOSIS — E87.6 HYPOKALEMIA: ICD-10-CM

## 2022-10-06 DIAGNOSIS — R10.13 EPIGASTRIC PAIN: ICD-10-CM

## 2022-10-06 DIAGNOSIS — Z90.49 S/P LAPAROSCOPIC CHOLECYSTECTOMY: Primary | ICD-10-CM

## 2022-10-06 LAB
ALBUMIN SERPL BCP-MCNC: 3.2 G/DL (ref 3.5–5.2)
ALLENS TEST: NORMAL
ALP SERPL-CCNC: 72 U/L (ref 55–135)
ALT SERPL W/O P-5'-P-CCNC: 24 U/L (ref 10–44)
ANION GAP SERPL CALC-SCNC: 8 MMOL/L (ref 8–16)
AST SERPL-CCNC: 32 U/L (ref 10–40)
BACTERIA #/AREA URNS HPF: ABNORMAL /HPF
BASOPHILS # BLD AUTO: 0.04 K/UL (ref 0–0.2)
BASOPHILS NFR BLD: 0.5 % (ref 0–1.9)
BILIRUB SERPL-MCNC: 0.6 MG/DL (ref 0.1–1)
BILIRUB UR QL STRIP: NEGATIVE
BUN SERPL-MCNC: 15 MG/DL (ref 6–20)
CALCIUM SERPL-MCNC: 9 MG/DL (ref 8.7–10.5)
CHLORIDE SERPL-SCNC: 103 MMOL/L (ref 95–110)
CLARITY UR: ABNORMAL
CO2 SERPL-SCNC: 26 MMOL/L (ref 23–29)
COLOR UR: YELLOW
CREAT SERPL-MCNC: 0.9 MG/DL (ref 0.5–1.4)
CTP QC/QA: YES
CTP QC/QA: YES
DELSYS: NORMAL
DIFFERENTIAL METHOD: ABNORMAL
EOSINOPHIL # BLD AUTO: 0.3 K/UL (ref 0–0.5)
EOSINOPHIL NFR BLD: 3.6 % (ref 0–8)
ERYTHROCYTE [DISTWIDTH] IN BLOOD BY AUTOMATED COUNT: 14.1 % (ref 11.5–14.5)
EST. GFR  (NO RACE VARIABLE): >60 ML/MIN/1.73 M^2
FIO2: 21
GLUCOSE SERPL-MCNC: 136 MG/DL (ref 70–110)
GLUCOSE UR QL STRIP: NEGATIVE
HCT VFR BLD AUTO: 30.9 % (ref 37–48.5)
HGB BLD-MCNC: 9.9 G/DL (ref 12–16)
HGB UR QL STRIP: NEGATIVE
HYALINE CASTS #/AREA URNS LPF: 1 /LPF
IMM GRANULOCYTES # BLD AUTO: 0.02 K/UL (ref 0–0.04)
IMM GRANULOCYTES NFR BLD AUTO: 0.3 % (ref 0–0.5)
INR PPP: 1 (ref 0.8–1.2)
KETONES UR QL STRIP: ABNORMAL
LACTATE SERPL-SCNC: 0.9 MMOL/L (ref 0.5–2.2)
LDH SERPL L TO P-CCNC: 0.74 MMOL/L (ref 0.5–2.2)
LEUKOCYTE ESTERASE UR QL STRIP: ABNORMAL
LIPASE SERPL-CCNC: 163 U/L (ref 4–60)
LYMPHOCYTES # BLD AUTO: 1.8 K/UL (ref 1–4.8)
LYMPHOCYTES NFR BLD: 23.1 % (ref 18–48)
MAGNESIUM SERPL-MCNC: 1.5 MG/DL (ref 1.6–2.6)
MCH RBC QN AUTO: 26.7 PG (ref 27–31)
MCHC RBC AUTO-ENTMCNC: 32 G/DL (ref 32–36)
MCV RBC AUTO: 83 FL (ref 82–98)
MICROSCOPIC COMMENT: ABNORMAL
MODE: NORMAL
MONOCYTES # BLD AUTO: 0.5 K/UL (ref 0.3–1)
MONOCYTES NFR BLD: 6.4 % (ref 4–15)
NEUTROPHILS # BLD AUTO: 5.2 K/UL (ref 1.8–7.7)
NEUTROPHILS NFR BLD: 66.1 % (ref 38–73)
NITRITE UR QL STRIP: NEGATIVE
NRBC BLD-RTO: 0 /100 WBC
PH UR STRIP: 6 [PH] (ref 5–8)
PHOSPHATE SERPL-MCNC: 3.5 MG/DL (ref 2.7–4.5)
PLATELET # BLD AUTO: 142 K/UL (ref 150–450)
PMV BLD AUTO: 9.8 FL (ref 9.2–12.9)
POC MOLECULAR INFLUENZA A AGN: NEGATIVE
POC MOLECULAR INFLUENZA B AGN: NEGATIVE
POTASSIUM SERPL-SCNC: 3.2 MMOL/L (ref 3.5–5.1)
PROCALCITONIN SERPL IA-MCNC: 0.32 NG/ML
PROT SERPL-MCNC: 6.9 G/DL (ref 6–8.4)
PROT UR QL STRIP: ABNORMAL
PROTHROMBIN TIME: 11.1 SEC (ref 9–12.5)
RBC # BLD AUTO: 3.71 M/UL (ref 4–5.4)
RBC #/AREA URNS HPF: 3 /HPF (ref 0–4)
SAMPLE: NORMAL
SARS-COV-2 RDRP RESP QL NAA+PROBE: NEGATIVE
SITE: NORMAL
SODIUM SERPL-SCNC: 137 MMOL/L (ref 136–145)
SP GR UR STRIP: 1.03 (ref 1–1.03)
SQUAMOUS #/AREA URNS HPF: 13 /HPF
URN SPEC COLLECT METH UR: ABNORMAL
UROBILINOGEN UR STRIP-ACNC: ABNORMAL EU/DL
WBC # BLD AUTO: 7.82 K/UL (ref 3.9–12.7)
WBC #/AREA URNS HPF: 11 /HPF (ref 0–5)

## 2022-10-06 PROCEDURE — 93005 ELECTROCARDIOGRAM TRACING: CPT

## 2022-10-06 PROCEDURE — 85610 PROTHROMBIN TIME: CPT | Performed by: PHYSICIAN ASSISTANT

## 2022-10-06 PROCEDURE — 63600175 PHARM REV CODE 636 W HCPCS: Performed by: EMERGENCY MEDICINE

## 2022-10-06 PROCEDURE — 85025 COMPLETE CBC W/AUTO DIFF WBC: CPT | Performed by: PHYSICIAN ASSISTANT

## 2022-10-06 PROCEDURE — 96376 TX/PRO/DX INJ SAME DRUG ADON: CPT | Mod: 59

## 2022-10-06 PROCEDURE — 99024 POSTOP FOLLOW-UP VISIT: CPT | Mod: ,,, | Performed by: SURGERY

## 2022-10-06 PROCEDURE — 84145 PROCALCITONIN (PCT): CPT | Performed by: PHYSICIAN ASSISTANT

## 2022-10-06 PROCEDURE — 84100 ASSAY OF PHOSPHORUS: CPT | Performed by: PHYSICIAN ASSISTANT

## 2022-10-06 PROCEDURE — 81000 URINALYSIS NONAUTO W/SCOPE: CPT | Performed by: PHYSICIAN ASSISTANT

## 2022-10-06 PROCEDURE — 99900035 HC TECH TIME PER 15 MIN (STAT)

## 2022-10-06 PROCEDURE — 83605 ASSAY OF LACTIC ACID: CPT

## 2022-10-06 PROCEDURE — 87086 URINE CULTURE/COLONY COUNT: CPT | Performed by: PHYSICIAN ASSISTANT

## 2022-10-06 PROCEDURE — 25000003 PHARM REV CODE 250: Performed by: EMERGENCY MEDICINE

## 2022-10-06 PROCEDURE — 83605 ASSAY OF LACTIC ACID: CPT | Performed by: PHYSICIAN ASSISTANT

## 2022-10-06 PROCEDURE — 87635 SARS-COV-2 COVID-19 AMP PRB: CPT | Performed by: EMERGENCY MEDICINE

## 2022-10-06 PROCEDURE — 93010 ELECTROCARDIOGRAM REPORT: CPT | Mod: ,,, | Performed by: INTERNAL MEDICINE

## 2022-10-06 PROCEDURE — G0378 HOSPITAL OBSERVATION PER HR: HCPCS

## 2022-10-06 PROCEDURE — 87040 BLOOD CULTURE FOR BACTERIA: CPT | Mod: 59 | Performed by: PHYSICIAN ASSISTANT

## 2022-10-06 PROCEDURE — 99024 PR POST-OP FOLLOW-UP VISIT: ICD-10-PCS | Mod: ,,, | Performed by: SURGERY

## 2022-10-06 PROCEDURE — 25500020 PHARM REV CODE 255: Performed by: EMERGENCY MEDICINE

## 2022-10-06 PROCEDURE — 63600175 PHARM REV CODE 636 W HCPCS: Performed by: STUDENT IN AN ORGANIZED HEALTH CARE EDUCATION/TRAINING PROGRAM

## 2022-10-06 PROCEDURE — 83735 ASSAY OF MAGNESIUM: CPT | Performed by: PHYSICIAN ASSISTANT

## 2022-10-06 PROCEDURE — 80053 COMPREHEN METABOLIC PANEL: CPT | Performed by: PHYSICIAN ASSISTANT

## 2022-10-06 PROCEDURE — 83690 ASSAY OF LIPASE: CPT | Performed by: PHYSICIAN ASSISTANT

## 2022-10-06 PROCEDURE — 96366 THER/PROPH/DIAG IV INF ADDON: CPT

## 2022-10-06 PROCEDURE — 96375 TX/PRO/DX INJ NEW DRUG ADDON: CPT | Mod: 59

## 2022-10-06 PROCEDURE — 96365 THER/PROPH/DIAG IV INF INIT: CPT

## 2022-10-06 PROCEDURE — 93010 EKG 12-LEAD: ICD-10-PCS | Mod: ,,, | Performed by: INTERNAL MEDICINE

## 2022-10-06 RX ORDER — ACETAMINOPHEN 325 MG/1
650 TABLET ORAL EVERY 4 HOURS PRN
Status: DISCONTINUED | OUTPATIENT
Start: 2022-10-06 | End: 2022-10-06 | Stop reason: HOSPADM

## 2022-10-06 RX ORDER — MORPHINE SULFATE 4 MG/ML
4 INJECTION, SOLUTION INTRAMUSCULAR; INTRAVENOUS
Status: COMPLETED | OUTPATIENT
Start: 2022-10-06 | End: 2022-10-06

## 2022-10-06 RX ORDER — GABAPENTIN 300 MG/1
300 CAPSULE ORAL 3 TIMES DAILY PRN
Qty: 15 CAPSULE | Refills: 0 | Status: SHIPPED | OUTPATIENT
Start: 2022-10-06

## 2022-10-06 RX ORDER — ONDANSETRON 2 MG/ML
8 INJECTION INTRAMUSCULAR; INTRAVENOUS
Status: COMPLETED | OUTPATIENT
Start: 2022-10-06 | End: 2022-10-06

## 2022-10-06 RX ORDER — METHOCARBAMOL 500 MG/1
500 TABLET, FILM COATED ORAL 4 TIMES DAILY PRN
Qty: 20 TABLET | Refills: 0 | Status: SHIPPED | OUTPATIENT
Start: 2022-10-06 | End: 2022-10-11

## 2022-10-06 RX ORDER — IBUPROFEN 200 MG
24 TABLET ORAL
Status: DISCONTINUED | OUTPATIENT
Start: 2022-10-06 | End: 2022-10-06 | Stop reason: HOSPADM

## 2022-10-06 RX ORDER — NALOXONE HCL 0.4 MG/ML
0.02 VIAL (ML) INJECTION
Status: DISCONTINUED | OUTPATIENT
Start: 2022-10-06 | End: 2022-10-06 | Stop reason: HOSPADM

## 2022-10-06 RX ORDER — LISINOPRIL 20 MG/1
20 TABLET ORAL DAILY
COMMUNITY
Start: 2022-09-06

## 2022-10-06 RX ORDER — ONDANSETRON 2 MG/ML
4 INJECTION INTRAMUSCULAR; INTRAVENOUS EVERY 8 HOURS PRN
Status: DISCONTINUED | OUTPATIENT
Start: 2022-10-06 | End: 2022-10-06 | Stop reason: HOSPADM

## 2022-10-06 RX ORDER — HYDROCODONE BITARTRATE AND ACETAMINOPHEN 7.5; 325 MG/1; MG/1
1 TABLET ORAL 3 TIMES DAILY PRN
Qty: 12 TABLET | Refills: 0 | Status: SHIPPED | OUTPATIENT
Start: 2022-10-06

## 2022-10-06 RX ORDER — IBUPROFEN 200 MG
16 TABLET ORAL
Status: DISCONTINUED | OUTPATIENT
Start: 2022-10-06 | End: 2022-10-06 | Stop reason: HOSPADM

## 2022-10-06 RX ORDER — SODIUM CHLORIDE 0.9 % (FLUSH) 0.9 %
10 SYRINGE (ML) INJECTION EVERY 12 HOURS PRN
Status: DISCONTINUED | OUTPATIENT
Start: 2022-10-06 | End: 2022-10-06 | Stop reason: HOSPADM

## 2022-10-06 RX ORDER — MORPHINE SULFATE 4 MG/ML
4 INJECTION, SOLUTION INTRAMUSCULAR; INTRAVENOUS EVERY 4 HOURS PRN
Status: DISCONTINUED | OUTPATIENT
Start: 2022-10-06 | End: 2022-10-06 | Stop reason: HOSPADM

## 2022-10-06 RX ORDER — GLUCAGON 1 MG
1 KIT INJECTION
Status: DISCONTINUED | OUTPATIENT
Start: 2022-10-06 | End: 2022-10-06 | Stop reason: HOSPADM

## 2022-10-06 RX ORDER — HYDROCODONE BITARTRATE AND ACETAMINOPHEN 7.5; 325 MG/1; MG/1
1 TABLET ORAL 3 TIMES DAILY PRN
COMMUNITY
Start: 2022-09-16 | End: 2022-10-06 | Stop reason: SDUPTHER

## 2022-10-06 RX ADMIN — ONDANSETRON 8 MG: 2 INJECTION INTRAMUSCULAR; INTRAVENOUS at 03:10

## 2022-10-06 RX ADMIN — SODIUM CHLORIDE 1000 ML: 0.9 INJECTION, SOLUTION INTRAVENOUS at 03:10

## 2022-10-06 RX ADMIN — ONDANSETRON 4 MG: 2 INJECTION INTRAMUSCULAR; INTRAVENOUS at 12:10

## 2022-10-06 RX ADMIN — MORPHINE SULFATE 4 MG: 4 INJECTION INTRAVENOUS at 05:10

## 2022-10-06 RX ADMIN — IOHEXOL 75 ML: 350 INJECTION, SOLUTION INTRAVENOUS at 02:10

## 2022-10-06 RX ADMIN — MORPHINE SULFATE 4 MG: 4 INJECTION INTRAVENOUS at 12:10

## 2022-10-06 RX ADMIN — PIPERACILLIN AND TAZOBACTAM 4.5 G: 4; .5 INJECTION, POWDER, LYOPHILIZED, FOR SOLUTION INTRAVENOUS; PARENTERAL at 03:10

## 2022-10-06 RX ADMIN — MORPHINE SULFATE 4 MG: 4 INJECTION INTRAVENOUS at 03:10

## 2022-10-06 NOTE — ED PROVIDER NOTES
"Encounter Date: 10/5/2022       History     Chief Complaint   Patient presents with    Post-op Problem     Pt reports having her gallbladder laparoscopically removed Monday (10/3/22); pt c/o yellowing & redness to bilateral eyes and dark urine starting yesterday; pt denies eye burning or pain but states they feel dry; pt denies dysuria, hx with these problems before, or hx of liver problems; pt states she was advised to seek medical care for any post op complications; pt denies known fever but is febrile at triage; no meds taken for fever     56 y.o. female with diabetes, hypertension elevated BMI presents emergency department complaining of acute, progressively worsening, right upper quadrant and right lower quadrant abdominal pain that began Monday elective laparoscopic cholecystectomy (Dr Ulloa).  She describes abdominal pain as "soreness " that is temporarily alleviated by taking hydrocodone.    She also reports a dry mouth and increased thirst and describes onset of scleral icterus which began yesterday.  She denies fever, cough, appetite change, food intolerance, nausea, vomiting, diarrhea, constipation, melena, bright red blood per rectum, shortness of breath or chest pain but reports abdominal pain worsens with deep inspiration.    The history is provided by the patient.   Review of patient's allergies indicates:  No Known Allergies  Past Medical History:   Diagnosis Date    Diabetes mellitus     Hypertension     Obesity, unspecified      Past Surgical History:   Procedure Laterality Date    ABDOMINAL SURGERY       SECTION       SECTION      HERNIA REPAIR      LAPAROSCOPIC CHOLECYSTECTOMY N/A 10/3/2022    Procedure: CHOLECYSTECTOMY, LAPAROSCOPIC;  Surgeon: Henrique Ulloa MD;  Location: [facility];  Service: General;  Laterality: N/A;  RN PREOP 2022----NAOMY GREEN    NECK MASS EXCISION      pph hemorrhoidectomy       Family History   Problem Relation Age of Onset    Hypertension Paternal " Grandmother      Social History     Tobacco Use    Smoking status: Never    Smokeless tobacco: Never   Substance Use Topics    Alcohol use: Not Currently     Comment: social    Drug use: No     Review of Systems   Constitutional:  Positive for fever. Negative for appetite change.   Respiratory:  Negative for cough and shortness of breath.    Cardiovascular:  Negative for chest pain.   Gastrointestinal:  Positive for abdominal pain. Negative for constipation, diarrhea, nausea and vomiting.   Endocrine: Positive for polydipsia.   Genitourinary:  Negative for dysuria, frequency and hematuria.   Skin:  Positive for wound.   All other systems reviewed and are negative.    Physical Exam     Initial Vitals [10/06/22 0000]   BP Pulse Resp Temp SpO2   133/71 102 17 (!) 100.4 °F (38 °C) 95 %      MAP       --         Physical Exam    Nursing note and vitals reviewed.  Constitutional: She appears well-developed and well-nourished. She is not diaphoretic. She is cooperative. No distress.   HENT:   Head: Normocephalic and atraumatic.   Eyes: Pupils are equal, round, and reactive to light. Right conjunctiva is not injected. Left conjunctiva is not injected. Scleral icterus is present.   Neck: Neck supple.   Normal range of motion.  Cardiovascular:  Normal rate and intact distal pulses.           Pulmonary/Chest: No accessory muscle usage. No tachypnea. No respiratory distress.   Abdominal: Abdomen is protuberant. Bowel sounds are normal. She exhibits no distension. A surgical scar is present. There is abdominal tenderness in the right upper quadrant, right lower quadrant and epigastric area.     There is guarding.   Musculoskeletal:         General: No tenderness. Normal range of motion.      Cervical back: Normal range of motion and neck supple.     Neurological: She is alert and oriented to person, place, and time. She has normal strength. Gait normal. GCS eye subscore is 4. GCS verbal subscore is 5. GCS motor subscore is 6.    Skin: Skin is warm. Capillary refill takes less than 2 seconds.   Psychiatric: She has a normal mood and affect.       ED Course   Procedures  Labs Reviewed   CBC W/ AUTO DIFFERENTIAL - Abnormal; Notable for the following components:       Result Value    RBC 3.71 (*)     Hemoglobin 9.9 (*)     Hematocrit 30.9 (*)     MCH 26.7 (*)     Platelets 142 (*)     All other components within normal limits   COMPREHENSIVE METABOLIC PANEL - Abnormal; Notable for the following components:    Potassium 3.2 (*)     Glucose 136 (*)     Albumin 3.2 (*)     All other components within normal limits   URINALYSIS, REFLEX TO URINE CULTURE - Abnormal; Notable for the following components:    Appearance, UA Hazy (*)     Protein, UA 1+ (*)     Ketones, UA 2+ (*)     Urobilinogen, UA 2.0-3.0 (*)     Leukocytes, UA 2+ (*)     All other components within normal limits    Narrative:     Specimen Source->Urine   MAGNESIUM - Abnormal; Notable for the following components:    Magnesium 1.5 (*)     All other components within normal limits   LIPASE - Abnormal; Notable for the following components:    Lipase 163 (*)     All other components within normal limits   PROCALCITONIN - Abnormal; Notable for the following components:    Procalcitonin 0.32 (*)     All other components within normal limits   URINALYSIS MICROSCOPIC - Abnormal; Notable for the following components:    WBC, UA 11 (*)     All other components within normal limits    Narrative:     Specimen Source->Urine   CULTURE, BLOOD   CULTURE, BLOOD   CULTURE, URINE   LACTIC ACID, PLASMA   PHOSPHORUS   PROTIME-INR   LACTIC ACID, PLASMA   SARS-COV-2 RDRP GENE   POCT INFLUENZA A/B MOLECULAR   ISTAT LACTATE          Imaging Results               CT Abdomen Pelvis With Contrast (Final result)  Result time 10/06/22 04:24:26      Final result by Soraya Franco MD (10/06/22 04:24:26)                   Impression:      1. Fluid and air containing collection epicentered in the gallbladder fossa  measuring up to 7.3 cm this patient status post cholecystectomy.  Findings could relate postoperative seroma, hematoma, biloma or infected fluid collection noting sterility of this fluid is unable to be assessed on CT.  Adjacent inflammatory change/fat stranding.  Appropriate subspecialty consultation advised.  2. Small foci of free intraperitoneal air in the mid anterior abdomen, presumably postoperative in etiology although perforated viscus cannot be definitively excluded.  3. Slight wall thickening of the colon at the level of the hepatic flexure and slight wall thickening of the distal stomach/proximal duodenum.  Findings presumably reactive due to aforementioned adjacent inflammatory change in the right upper quadrant.  4. Bibasilar atelectatic change.  Or focal right lower lobe atelectasis/consolidative change.  5. Additional findings as above.  This report was flagged in Epic as abnormal.      Electronically signed by: Soraya Franco MD  Date:    10/06/2022  Time:    04:24               Narrative:    EXAMINATION:  CT ABDOMEN PELVIS WITH CONTRAST    CLINICAL HISTORY:  Abdominal pain, post-op;    TECHNIQUE:  Low dose axial images, sagittal and coronal reformations were obtained from the lung bases to the pubic symphysis following the IV administration of 75 mL of Omnipaque 350 .  No oral contrast.    COMPARISON:  None.    FINDINGS:  The visualized lung bases demonstrate bibasilar atelectatic change.  Slightly more focal linear opacity in the right lower lobe which could relate atelectatic/consolidative change.  No pleural fluid.  The visualized portions of the heart and pericardium demonstrate calcific atherosclerosis of the coronary vessels.    The liver is prominent in size and diffusely hypoattenuating which can be seen with hepatic steatosis.  Correlation with LFTs advised.  The portal vein and splenic vein appear patent. The spleen, pancreas and adrenal glands are unremarkable.    The gallbladder is  surgically absent in this patient status post reported cholecystectomy on 10/03/2022.  There is fluid and air containing collection epicentered in the gallbladder fossa measuring approximately 6.5 x 5.5 x 7.3 cm in AP by TV by CC diameter (axial series 2, image 67 and coronal series 602, image 90).  Findings could reflect postoperative seroma, hematoma, biloma or infected fluid collection noting the sterility of this fluid is not able to be assessed on CT.  There is adjacent inflammatory change/fat stranding.  Note is made of slight wall thickening of the colon at the level of the hepatic flexure and slight wall thickening of the distal stomach/proximal duodenum.  Findings are possibly reactive in etiology due to aforementioned adjacent inflammatory change/fat stranding.  The remaining visualized loops of large and small bowel demonstrate no evidence of obstruction or inflammatory change.  The appendix is not definitively visualized, likely surgically absent is there is a presumed suture line at the base of the cecum.  No significant intra or extrahepatic biliary ductal dilatation.  There are small foci of free intraperitoneal air in the anterior mid abdomen (axial series 2, image ).  Findings presumably postoperative in etiology although perforated viscus cannot be definitively excluded.    The kidneys are normal in size and location and enhance symmetrically.  No evidence of hydronephrosis.  The urinary bladder is suboptimally distended limiting evaluation.  The uterus and adnexal regions are within normal limits.    The abdominal aorta is nonaneurysmal with atherosclerosis.  Shotty periaortic lymph nodes present.    Visualized osseous structures are intact with degenerative change.  There is postoperative change of the anterior abdominal wall.  Small focus of air in the soft tissues of the right abdominal wall likely relate to recent instrumentation or possible injections.                                        X-Ray Chest AP Portable (Final result)  Result time 10/06/22 00:54:56      Final result by Soraya Franco MD (10/06/22 00:54:56)                   Impression:      No acute intrathoracic abnormality identified on this single hypoventilatory radiographic view of the chest.      Electronically signed by: Soraya Franco MD  Date:    10/06/2022  Time:    00:54               Narrative:    EXAMINATION:  XR CHEST AP PORTABLE    CLINICAL HISTORY:  Sepsis;    TECHNIQUE:  Single frontal view of the chest was performed.    COMPARISON:  None    FINDINGS:  Mediastinal structures are midline.  The cardiomediastinal silhouette appears to be within normal limits of size and configuration allowing for accentuation by portable AP technique.  There are low lung volumes bilaterally with slight increased interstitial prominence likely relating to crowding of pulmonary bronchovascular markings due to hypoventilatory status.  No confluent airspace consolidation, large volume of pleural fluid or pneumothorax identified.  Osseous structures are intact.                                       Medications   piperacillin-tazobactam 4.5 g in dextrose 5 % 100 mL IVPB (ready to mix system) (4.5 g Intravenous New Bag 10/6/22 0300)   morphine injection 4 mg (has no administration in time range)   sodium chloride 0.9% bolus 1,000 mL (1,000 mLs Intravenous New Bag 10/6/22 0300)   morphine injection 4 mg (4 mg Intravenous Given 10/6/22 0315)   ondansetron injection 8 mg (8 mg Intravenous Given 10/6/22 0314)   iohexoL (OMNIPAQUE 350) injection 75 mL (75 mLs Intravenous Given 10/6/22 0257)                 ED Course as of 10/06/22 0445   Thu Oct 06, 2022   0250 Case discussed with Gen surg resident, on call for Dr Ulloa to notify patient in ED while awaiting results of CT. [DL]   0259 Chart review from:    9/27/22  H/H: 11.2/34.8    9/16/22  Lipase 211 [DL]   0437 I spoke with Dr. Virk and he states that the patient will need a HIDA scan to  further rule out biliary pathology. [CC]   0441 On re-evaluation patient notes significant pain in the right upper quadrant.  Significant pain on palpation of the right upper quadrant of the abdomen.  Patient will need another dose of pain medication.  Plan to place in observation under general surgery for HIDA scan and pain control. [CC]   0444 CT Abdomen Pelvis With Contrast(!)  Impression:     1. Fluid and air containing collection epicentered in the gallbladder fossa measuring up to 7.3 cm this patient status post cholecystectomy.  Findings could relate postoperative seroma, hematoma, biloma or infected fluid collection noting sterility of this fluid is unable to be assessed on CT.  Adjacent inflammatory change/fat stranding.  Appropriate subspecialty consultation advised.  2. Small foci of free intraperitoneal air in the mid anterior abdomen, presumably postoperative in etiology although perforated viscus cannot be definitively excluded.  3. Slight wall thickening of the colon at the level of the hepatic flexure and slight wall thickening of the distal stomach/proximal duodenum.  Findings presumably reactive due to aforementioned adjacent inflammatory change in the right upper quadrant.  4. Bibasilar atelectatic change.  Or focal right lower lobe atelectasis/consolidative change.  5. Additional findings as above.  This report was flagged in Epic as abnormal.    [CC]   0444 Patient presenting with right upper quadrant pain in the postoperative setting.  Three days ago patient cholecystectomy, elective.  Patient with elevated lipase 163.  Concern for possible pancreatitis but pancreas is unremarkable on CT imaging.  There was concern for scleral icterus but patient is not hyperbilirubinemia.  No leukocytosis.  She was febrile on arrival.  COVID floor negative.  Lactic negative.  Initial concern for possible postoperative site infection.  There is fluid in the area on CT but the sterility of the fluid is unable to  be assessed on CT.  General surgery recommends HIDA scan.  Patient with intractable abdominal pain.  Plan to place in observation with General surgery for further workup, management. [CC]      ED Course User Index  [CC] Trenton Arredondo MD  [DL] Kim Ba MD               Labs Reviewed  Admission on 10/06/2022   Component Date Value Ref Range Status    WBC 10/06/2022 7.82  3.90 - 12.70 K/uL Final    RBC 10/06/2022 3.71 (L)  4.00 - 5.40 M/uL Final    Hemoglobin 10/06/2022 9.9 (L)  12.0 - 16.0 g/dL Final    Hematocrit 10/06/2022 30.9 (L)  37.0 - 48.5 % Final    MCV 10/06/2022 83  82 - 98 fL Final    MCH 10/06/2022 26.7 (L)  27.0 - 31.0 pg Final    MCHC 10/06/2022 32.0  32.0 - 36.0 g/dL Final    RDW 10/06/2022 14.1  11.5 - 14.5 % Final    Platelets 10/06/2022 142 (L)  150 - 450 K/uL Final    MPV 10/06/2022 9.8  9.2 - 12.9 fL Final    Immature Granulocytes 10/06/2022 0.3  0.0 - 0.5 % Final    Gran # (ANC) 10/06/2022 5.2  1.8 - 7.7 K/uL Final    Immature Grans (Abs) 10/06/2022 0.02  0.00 - 0.04 K/uL Final    Comment: Mild elevation in immature granulocytes is non specific and   can be seen in a variety of conditions including stress response,   acute inflammation, trauma and pregnancy. Correlation with other   laboratory and clinical findings is essential.      Lymph # 10/06/2022 1.8  1.0 - 4.8 K/uL Final    Mono # 10/06/2022 0.5  0.3 - 1.0 K/uL Final    Eos # 10/06/2022 0.3  0.0 - 0.5 K/uL Final    Baso # 10/06/2022 0.04  0.00 - 0.20 K/uL Final    nRBC 10/06/2022 0  0 /100 WBC Final    Gran % 10/06/2022 66.1  38.0 - 73.0 % Final    Lymph % 10/06/2022 23.1  18.0 - 48.0 % Final    Mono % 10/06/2022 6.4  4.0 - 15.0 % Final    Eosinophil % 10/06/2022 3.6  0.0 - 8.0 % Final    Basophil % 10/06/2022 0.5  0.0 - 1.9 % Final    Differential Method 10/06/2022 Automated   Final    Sodium 10/06/2022 137  136 - 145 mmol/L Final    Potassium 10/06/2022 3.2 (L)  3.5 - 5.1 mmol/L Final    Chloride 10/06/2022 103  95 - 110 mmol/L  Final    CO2 10/06/2022 26  23 - 29 mmol/L Final    Glucose 10/06/2022 136 (H)  70 - 110 mg/dL Final    BUN 10/06/2022 15  6 - 20 mg/dL Final    Creatinine 10/06/2022 0.9  0.5 - 1.4 mg/dL Final    Calcium 10/06/2022 9.0  8.7 - 10.5 mg/dL Final    Total Protein 10/06/2022 6.9  6.0 - 8.4 g/dL Final    Albumin 10/06/2022 3.2 (L)  3.5 - 5.2 g/dL Final    Total Bilirubin 10/06/2022 0.6  0.1 - 1.0 mg/dL Final    Comment: For infants and newborns, interpretation of results should be based  on gestational age, weight and in agreement with clinical  observations.    Premature Infant recommended reference ranges:  Up to 24 hours.............<8.0 mg/dL  Up to 48 hours............<12.0 mg/dL  3-5 days..................<15.0 mg/dL  6-29 days.................<15.0 mg/dL      Alkaline Phosphatase 10/06/2022 72  55 - 135 U/L Final    AST 10/06/2022 32  10 - 40 U/L Final    ALT 10/06/2022 24  10 - 44 U/L Final    Anion Gap 10/06/2022 8  8 - 16 mmol/L Final    eGFR 10/06/2022 >60  >60 mL/min/1.73 m^2 Final    Lactate (Lactic Acid) 10/06/2022 0.9  0.5 - 2.2 mmol/L Final    Comment: Falsely low lactic acid results can be found in samples   containing >=13.0 mg/dL total bilirubin and/or >=3.5 mg/dL   direct bilirubin.      Specimen UA 10/06/2022 Urine, Clean Catch   Final    Color, UA 10/06/2022 Yellow  Yellow, Straw, Miya Final    Appearance, UA 10/06/2022 Hazy (A)  Clear Final    pH, UA 10/06/2022 6.0  5.0 - 8.0 Final    Specific Gravity, UA 10/06/2022 1.030  1.005 - 1.030 Final    Protein, UA 10/06/2022 1+ (A)  Negative Final    Comment: Recommend a 24 hour urine protein or a urine   protein/creatinine ratio if globulin induced proteinuria is  clinically suspected.      Glucose, UA 10/06/2022 Negative  Negative Final    Ketones, UA 10/06/2022 2+ (A)  Negative Final    Bilirubin (UA) 10/06/2022 Negative  Negative Final    Occult Blood UA 10/06/2022 Negative  Negative Final    Nitrite, UA 10/06/2022 Negative  Negative Final     Urobilinogen, UA 10/06/2022 2.0-3.0 (A)  <2.0 EU/dL Final    Leukocytes, UA 10/06/2022 2+ (A)  Negative Final    Magnesium 10/06/2022 1.5 (L)  1.6 - 2.6 mg/dL Final    Phosphorus 10/06/2022 3.5  2.7 - 4.5 mg/dL Final    Prothrombin Time 10/06/2022 11.1  9.0 - 12.5 sec Final    INR 10/06/2022 1.0  0.8 - 1.2 Final    Comment: Coumadin Therapy:  2.0 - 3.0 for INR for all indicators except mechanical heart valves  and antiphospholipid syndromes which should use 2.5 - 3.5.      Lipase 10/06/2022 163 (H)  4 - 60 U/L Final    Procalcitonin 10/06/2022 0.32 (H)  <0.25 ng/mL Final    Comment: A concentration < 0.25 ng/mL represents a low risk of bacterial   infection.  Procalcitonin may not be accurate among patients with localized   infection, recent trauma or major surgery, immunosuppressed state,   invasive fungal infection, renal dysfunction. Decisions regarding   initiation or continuation of antibiotic therapy should not be based   solely on procalcitonin levels.      POC Lactate 10/06/2022 0.74  0.5 - 2.2 mmol/L Final    Sample 10/06/2022 VENOUS   Final    Site 10/06/2022 Other   Final    Allens Test 10/06/2022 N/A   Final    DelSys 10/06/2022 Room Air   Final    Mode 10/06/2022 SPONT   Final    FiO2 10/06/2022 21   Final    RBC, UA 10/06/2022 3  0 - 4 /hpf Final    WBC, UA 10/06/2022 11 (H)  0 - 5 /hpf Final    Bacteria 10/06/2022 Rare  None-Occ /hpf Final    Squam Epithel, UA 10/06/2022 13  /hpf Final    Hyaline Casts, UA 10/06/2022 1  0-1/lpf /lpf Final    Microscopic Comment 10/06/2022 SEE COMMENT   Final    Comment: Other formed elements not mentioned in the report are not   present in the microscopic examination.       POC Rapid COVID 10/06/2022 Negative  Negative Final     Acceptable 10/06/2022 Yes   Final    POC Molecular Influenza A Ag 10/06/2022 Negative  Negative, Not Reported Final    POC Molecular Influenza B Ag 10/06/2022 Negative  Negative, Not Reported Final     Acceptable  10/06/2022 Yes   Final        Imaging Reviewed    Imaging Results               CT Abdomen Pelvis With Contrast (Final result)  Result time 10/06/22 04:24:26      Final result by Soraya Franco MD (10/06/22 04:24:26)                   Impression:      1. Fluid and air containing collection epicentered in the gallbladder fossa measuring up to 7.3 cm this patient status post cholecystectomy.  Findings could relate postoperative seroma, hematoma, biloma or infected fluid collection noting sterility of this fluid is unable to be assessed on CT.  Adjacent inflammatory change/fat stranding.  Appropriate subspecialty consultation advised.  2. Small foci of free intraperitoneal air in the mid anterior abdomen, presumably postoperative in etiology although perforated viscus cannot be definitively excluded.  3. Slight wall thickening of the colon at the level of the hepatic flexure and slight wall thickening of the distal stomach/proximal duodenum.  Findings presumably reactive due to aforementioned adjacent inflammatory change in the right upper quadrant.  4. Bibasilar atelectatic change.  Or focal right lower lobe atelectasis/consolidative change.  5. Additional findings as above.  This report was flagged in Epic as abnormal.      Electronically signed by: Soraya Franco MD  Date:    10/06/2022  Time:    04:24               Narrative:    EXAMINATION:  CT ABDOMEN PELVIS WITH CONTRAST    CLINICAL HISTORY:  Abdominal pain, post-op;    TECHNIQUE:  Low dose axial images, sagittal and coronal reformations were obtained from the lung bases to the pubic symphysis following the IV administration of 75 mL of Omnipaque 350 .  No oral contrast.    COMPARISON:  None.    FINDINGS:  The visualized lung bases demonstrate bibasilar atelectatic change.  Slightly more focal linear opacity in the right lower lobe which could relate atelectatic/consolidative change.  No pleural fluid.  The visualized portions of the heart and pericardium  demonstrate calcific atherosclerosis of the coronary vessels.    The liver is prominent in size and diffusely hypoattenuating which can be seen with hepatic steatosis.  Correlation with LFTs advised.  The portal vein and splenic vein appear patent. The spleen, pancreas and adrenal glands are unremarkable.    The gallbladder is surgically absent in this patient status post reported cholecystectomy on 10/03/2022.  There is fluid and air containing collection epicentered in the gallbladder fossa measuring approximately 6.5 x 5.5 x 7.3 cm in AP by TV by CC diameter (axial series 2, image 67 and coronal series 602, image 90).  Findings could reflect postoperative seroma, hematoma, biloma or infected fluid collection noting the sterility of this fluid is not able to be assessed on CT.  There is adjacent inflammatory change/fat stranding.  Note is made of slight wall thickening of the colon at the level of the hepatic flexure and slight wall thickening of the distal stomach/proximal duodenum.  Findings are possibly reactive in etiology due to aforementioned adjacent inflammatory change/fat stranding.  The remaining visualized loops of large and small bowel demonstrate no evidence of obstruction or inflammatory change.  The appendix is not definitively visualized, likely surgically absent is there is a presumed suture line at the base of the cecum.  No significant intra or extrahepatic biliary ductal dilatation.  There are small foci of free intraperitoneal air in the anterior mid abdomen (axial series 2, image ).  Findings presumably postoperative in etiology although perforated viscus cannot be definitively excluded.    The kidneys are normal in size and location and enhance symmetrically.  No evidence of hydronephrosis.  The urinary bladder is suboptimally distended limiting evaluation.  The uterus and adnexal regions are within normal limits.    The abdominal aorta is nonaneurysmal with atherosclerosis.  Shotty  periaortic lymph nodes present.    Visualized osseous structures are intact with degenerative change.  There is postoperative change of the anterior abdominal wall.  Small focus of air in the soft tissues of the right abdominal wall likely relate to recent instrumentation or possible injections.                                       X-Ray Chest AP Portable (Final result)  Result time 10/06/22 00:54:56      Final result by Soraya Franco MD (10/06/22 00:54:56)                   Impression:      No acute intrathoracic abnormality identified on this single hypoventilatory radiographic view of the chest.      Electronically signed by: Soraya Franco MD  Date:    10/06/2022  Time:    00:54               Narrative:    EXAMINATION:  XR CHEST AP PORTABLE    CLINICAL HISTORY:  Sepsis;    TECHNIQUE:  Single frontal view of the chest was performed.    COMPARISON:  None    FINDINGS:  Mediastinal structures are midline.  The cardiomediastinal silhouette appears to be within normal limits of size and configuration allowing for accentuation by portable AP technique.  There are low lung volumes bilaterally with slight increased interstitial prominence likely relating to crowding of pulmonary bronchovascular markings due to hypoventilatory status.  No confluent airspace consolidation, large volume of pleural fluid or pneumothorax identified.  Osseous structures are intact.                                      Medications given in ED    Medications   piperacillin-tazobactam 4.5 g in dextrose 5 % 100 mL IVPB (ready to mix system) (4.5 g Intravenous New Bag 10/6/22 0300)   morphine injection 4 mg (has no administration in time range)   sodium chloride 0.9% bolus 1,000 mL (1,000 mLs Intravenous New Bag 10/6/22 0300)   morphine injection 4 mg (4 mg Intravenous Given 10/6/22 0315)   ondansetron injection 8 mg (8 mg Intravenous Given 10/6/22 0314)   iohexoL (OMNIPAQUE 350) injection 75 mL (75 mLs Intravenous Given 10/6/22 0257)       Note  was created using voice recognition software. Note may have occasional typographical errors that may not have been identified and edited despite good lorrie initial review prior to signing.    Clinical Impression:   Final diagnoses:  [R10.13] Epigastric pain  [Z90.49] S/P laparoscopic cholecystectomy (Primary)  [E87.6] Hypokalemia  [R10.11] Right upper quadrant abdominal pain        ED Disposition Condition    Observation                 Trenton Arredondo MD  10/06/22 0446

## 2022-10-06 NOTE — H&P
[facility] [place] Emergency Dept  General Surgery  History & Physical    Patient Name: Kelli Rosas  MRN: [id number]  Admission Date: 10/6/2022  Attending Physician: Henrique Ulloa MD   Primary Care Provider: Anastacio Perez MD    Patient information was obtained from patient and ER records.     Subjective:     Chief Complaint/Reason for Admission: abdominal pain    History of Present Illness: Kelli Rosas is a 56yoF with a past medical history significant for HTN, diabetes who underwent laparoscopic cholecystectomy on 10/3/22. She presents to the emergency department with abdominal pain since her surgery. She implicitly states that this pain is different from her previous right upper quadrant pain prior to the surgery. She has no other associated symptoms. Work up is largely normal-- normal WBC, normal bili, normal LFTs. CT scan demonstrates a fluid collection within the gallbladder fossa, which is to be expected in the acute post-operative setting. General surgery consulted for evaluation.     On my exam, the patient is resting in bed. She is alert and oriented. No scleral icterus appreciated. She continues to endorse incisional tenderness. Plan for HIDA scan to assess biliary system. Plan of care discussed with patient, significant other.         No current facility-administered medications on file prior to encounter.     Current Outpatient Medications on File Prior to Encounter   Medication Sig    atenoloL (TENORMIN) 100 MG tablet Take 100 mg by mouth.    bumetanide (BUMEX) 1 MG tablet Take 1 mg by mouth once daily.    busPIRone (BUSPAR) 5 MG Tab Take 5 mg by mouth 3 (three) times daily as needed.    butalbital-acetaminophen-caffeine -40 mg (FIORICET, ESGIC) -40 mg per tablet TAKE 1 TABLET BY MOUTH EVERY 4 (FOUR) HOURS AS NEEDED FOR HEADACHES MAX DAILY AMOUNT  6 TABLETS    docusate sodium (COLACE) 100 MG capsule Take 100 mg by mouth 2 (two) times daily.    famotidine (PEPCID) 20 MG tablet Take  1 tablet (20 mg total) by mouth 2 (two) times daily.    furosemide (LASIX) 40 MG tablet     HYDROcodone-acetaminophen (NORCO) 5-325 mg per tablet Take 1 tablet by mouth every 6 (six) hours as needed for Pain.    JANUMET 50-1,000 mg per tablet Take 1 tablet by mouth 2 (two) times daily with meals.    lisinopril (PRINIVIL,ZESTRIL) 40 MG tablet     omeprazole (PRILOSEC) 40 MG capsule Take 40 mg by mouth once daily.    rosuvastatin (CRESTOR) 20 MG tablet Take 20 mg by mouth once daily.    RYBELSUS 14 mg tablet Take 14 mg by mouth once daily.       Review of patient's allergies indicates:  No Known Allergies    Past Medical History:   Diagnosis Date    Diabetes mellitus     Hypertension     Obesity, unspecified      Past Surgical History:   Procedure Laterality Date    ABDOMINAL SURGERY       SECTION       SECTION      HERNIA REPAIR      LAPAROSCOPIC CHOLECYSTECTOMY N/A 10/3/2022    Procedure: CHOLECYSTECTOMY, LAPAROSCOPIC;  Surgeon: Henrique Ulloa MD;  Location: [facility];  Service: General;  Laterality: N/A;  RN PREOP 2022----NAOMY GREEN    NECK MASS EXCISION      pph hemorrhoidectomy       Family History       Problem Relation (Age of Onset)    Hypertension Paternal Grandmother          Tobacco Use    Smoking status: Never    Smokeless tobacco: Never   Substance and Sexual Activity    Alcohol use: Not Currently     Comment: social    Drug use: No    Sexual activity: Not Currently     Review of Systems   Constitutional:  Positive for activity change and fatigue. Negative for appetite change, chills and fever.   HENT: Negative.     Eyes: Negative.    Respiratory:  Negative for cough, chest tightness and shortness of breath.    Cardiovascular:  Negative for chest pain.   Gastrointestinal:  Positive for abdominal pain. Negative for abdominal distention, constipation, diarrhea, nausea and vomiting.   Endocrine: Negative.    Genitourinary: Negative.    Musculoskeletal: Negative.     Skin: Negative.    Objective:     Vital Signs (Most Recent):  Temp: 97.7 °F (36.5 °C) (10/06/22 0555)  Pulse: 75 (10/06/22 0902)  Resp: 17 (10/06/22 0902)  BP: 104/62 (10/06/22 0902)  SpO2: 95 % (10/06/22 0904) Vital Signs (24h Range):  Temp:  [97.7 °F (36.5 °C)-100.4 °F (38 °C)] 97.7 °F (36.5 °C)  Pulse:  [] 75  Resp:  [17-26] 17  SpO2:  [93 %-97 %] 95 %  BP: ()/(51-71) 104/62     Weight: 104.3 kg (230 lb)  Body mass index is 38.27 kg/m².    Physical Exam  Vitals and nursing note reviewed.   Constitutional:       General: She is not in acute distress.     Appearance: Normal appearance.   HENT:      Head: Normocephalic and atraumatic.      Nose: Nose normal.      Mouth/Throat:      Mouth: Mucous membranes are moist.   Eyes:      General: No scleral icterus.  Cardiovascular:      Rate and Rhythm: Normal rate and regular rhythm.      Pulses: Normal pulses.   Pulmonary:      Effort: Pulmonary effort is normal. No respiratory distress.   Abdominal:      Comments: Abdomen soft, non-distended  Appropriate incisional tenderness  Non-peritonitic   Skin:     Coloration: Skin is not jaundiced.   Neurological:      Mental Status: She is alert.       Significant Labs:  I have reviewed all pertinent lab results within the past 24 hours.  CBC:   Recent Labs   Lab 10/06/22  0046   WBC 7.82   RBC 3.71*   HGB 9.9*   HCT 30.9*   *   MCV 83   MCH 26.7*   MCHC 32.0     CMP:   Recent Labs   Lab 10/06/22  0046   *   CALCIUM 9.0   ALBUMIN 3.2*   PROT 6.9      K 3.2*   CO2 26      BUN 15   CREATININE 0.9   ALKPHOS 72   ALT 24   AST 32   BILITOT 0.6       Significant Diagnostics:  I have reviewed all pertinent imaging results/findings within the past 24 hours.      Assessment/Plan:     S/P laparoscopic cholecystectomy  Kelli Rosas is a 56yoF with abdominal pain following a laparoscopic cholecystectomy on 10/3.    - patient seen and examined  - placed in observation for HIDA scan  - labs and imaging  reviewed-- normal WBC, normal bili, normal LFTs; mildly elevated lipase  - subjectively endorsing incisional tenderness  - HIDA scan to assess biliary system  - NPO  - pain control      VTE Risk Mitigation (From admission, onward)         Ordered     IP VTE LOW RISK PATIENT  Once         10/06/22 0457     Place sequential compression device  Until discontinued         10/06/22 0457                Maico Virk MD  General Surgery  [facility] - [place] - Emergency Dept

## 2022-10-06 NOTE — PHARMACY MED REC
"Admission Medication History     The home medication history was taken by Tete Malhotra CPhT.      You may go to "Admission" then "Reconcile Home Medications" tabs to review and/or act upon these items.     The home medication list has been updated by the Pharmacy department.   Please read ALL comments highlighted in yellow.   Please address this information as you see fit.    Feel free to contact us if you have any questions or require assistance.        Medications listed below were obtained from: Patient/family and Analytic software- [employer]  (Not in a hospital admission)          Tete Malhotra CPhT.  [phone]                  .        "

## 2022-10-06 NOTE — SUBJECTIVE & OBJECTIVE
No current facility-administered medications on file prior to encounter.     Current Outpatient Medications on File Prior to Encounter   Medication Sig    atenoloL (TENORMIN) 100 MG tablet Take 100 mg by mouth.    bumetanide (BUMEX) 1 MG tablet Take 1 mg by mouth once daily.    busPIRone (BUSPAR) 5 MG Tab Take 5 mg by mouth 3 (three) times daily as needed.    butalbital-acetaminophen-caffeine -40 mg (FIORICET, ESGIC) -40 mg per tablet TAKE 1 TABLET BY MOUTH EVERY 4 (FOUR) HOURS AS NEEDED FOR HEADACHES MAX DAILY AMOUNT  6 TABLETS    docusate sodium (COLACE) 100 MG capsule Take 100 mg by mouth 2 (two) times daily.    famotidine (PEPCID) 20 MG tablet Take 1 tablet (20 mg total) by mouth 2 (two) times daily.    furosemide (LASIX) 40 MG tablet     HYDROcodone-acetaminophen (NORCO) 5-325 mg per tablet Take 1 tablet by mouth every 6 (six) hours as needed for Pain.    JANUMET 50-1,000 mg per tablet Take 1 tablet by mouth 2 (two) times daily with meals.    lisinopril (PRINIVIL,ZESTRIL) 40 MG tablet     omeprazole (PRILOSEC) 40 MG capsule Take 40 mg by mouth once daily.    rosuvastatin (CRESTOR) 20 MG tablet Take 20 mg by mouth once daily.    RYBELSUS 14 mg tablet Take 14 mg by mouth once daily.       Review of patient's allergies indicates:  No Known Allergies    Past Medical History:   Diagnosis Date    Diabetes mellitus     Hypertension     Obesity, unspecified      Past Surgical History:   Procedure Laterality Date    ABDOMINAL SURGERY       SECTION       SECTION      HERNIA REPAIR      LAPAROSCOPIC CHOLECYSTECTOMY N/A 10/3/2022    Procedure: CHOLECYSTECTOMY, LAPAROSCOPIC;  Surgeon: Henrique Ulloa MD;  Location: [facility];  Service: General;  Laterality: N/A;  RN PREOP 2022----NAOMY GREEN    NECK MASS EXCISION      pph hemorrhoidectomy       Family History       Problem Relation (Age of Onset)    Hypertension Paternal Grandmother          Tobacco Use    Smoking status: Never    Smokeless  tobacco: Never   Substance and Sexual Activity    Alcohol use: Not Currently     Comment: social    Drug use: No    Sexual activity: Not Currently     Review of Systems   Constitutional:  Positive for activity change and fatigue. Negative for appetite change, chills and fever.   HENT: Negative.     Eyes: Negative.    Respiratory:  Negative for cough, chest tightness and shortness of breath.    Cardiovascular:  Negative for chest pain.   Gastrointestinal:  Positive for abdominal pain. Negative for abdominal distention, constipation, diarrhea, nausea and vomiting.   Endocrine: Negative.    Genitourinary: Negative.    Musculoskeletal: Negative.    Skin: Negative.    Objective:     Vital Signs (Most Recent):  Temp: 97.7 °F (36.5 °C) (10/06/22 0555)  Pulse: 75 (10/06/22 0902)  Resp: 17 (10/06/22 0902)  BP: 104/62 (10/06/22 0902)  SpO2: 95 % (10/06/22 0904) Vital Signs (24h Range):  Temp:  [97.7 °F (36.5 °C)-100.4 °F (38 °C)] 97.7 °F (36.5 °C)  Pulse:  [] 75  Resp:  [17-26] 17  SpO2:  [93 %-97 %] 95 %  BP: ()/(51-71) 104/62     Weight: 104.3 kg (230 lb)  Body mass index is 38.27 kg/m².    Physical Exam  Vitals and nursing note reviewed.   Constitutional:       General: She is not in acute distress.     Appearance: Normal appearance.   HENT:      Head: Normocephalic and atraumatic.      Nose: Nose normal.      Mouth/Throat:      Mouth: Mucous membranes are moist.   Eyes:      General: No scleral icterus.  Cardiovascular:      Rate and Rhythm: Normal rate and regular rhythm.      Pulses: Normal pulses.   Pulmonary:      Effort: Pulmonary effort is normal. No respiratory distress.   Abdominal:      Comments: Abdomen soft, non-distended  Appropriate incisional tenderness  Non-peritonitic   Skin:     Coloration: Skin is not jaundiced.   Neurological:      Mental Status: She is alert.       Significant Labs:  I have reviewed all pertinent lab results within the past 24 hours.  CBC:   Recent Labs   Lab 10/06/22  0046    WBC 7.82   RBC 3.71*   HGB 9.9*   HCT 30.9*   *   MCV 83   MCH 26.7*   MCHC 32.0     CMP:   Recent Labs   Lab 10/06/22  0046   *   CALCIUM 9.0   ALBUMIN 3.2*   PROT 6.9      K 3.2*   CO2 26      BUN 15   CREATININE 0.9   ALKPHOS 72   ALT 24   AST 32   BILITOT 0.6       Significant Diagnostics:  I have reviewed all pertinent imaging results/findings within the past 24 hours.

## 2022-10-06 NOTE — DISCHARGE SUMMARY
[facility] - [place] Emergency Dept  General Surgery  Discharge Summary      Patient Name: Kelli Rosas  MRN: [id number]  Admission Date: 10/6/2022  Hospital Length of Stay: 0 days  Discharge Date and Time:  10/06/2022 1:16 PM  Attending Physician: Henrique Ulloa MD   Discharging Provider: Maico Virk MD  Primary Care Provider: Anastacio Perez MD    HPI:   Kelli Rosas is a 56yoF with a past medical history significant for HTN, diabetes who underwent laparoscopic cholecystectomy on 10/3/22. She presents to the emergency department with abdominal pain since her surgery. She implicitly states that this pain is different from her previous right upper quadrant pain prior to the surgery. She has no other associated symptoms. Work up is largely normal-- normal WBC, normal bili, normal LFTs. CT scan demonstrates a fluid collection within the gallbladder fossa, which is to be expected in the acute post-operative setting. General surgery consulted for evaluation.     On my exam, the patient is resting in bed. She is alert and oriented. No scleral icterus appreciated. She continues to endorse incisional tenderness. Plan for HIDA scan to assess biliary system. Plan of care discussed with patient, significant other.         * No surgery found *      Indwelling Lines/Drains at time of discharge:   Lines/Drains/Airways     None               Hospital Course: Kelli Rosas underwent laparoscopic cholecystectomy on 10/3/22 and represents to the emergency department with abdominal pain. The patient underwent a thorough work-up including comprehensive lab work, a CT scan and a HIDA scan, which did no demonstrate abnormalities. She states that her pain remains, but it appears as normal post-operative abdominal pain. The patient was sent home with a short-course of multi-modal pain regimen. She will follow up with Dr. Ulloa in two weeks. Instructions provided to patient and her family.       Goals of Care Treatment  Preferences:  Code Status: Full Code      Consults:     Significant Diagnostic Studies: Labs: All labs within the past 24 hours have been reviewed    Pending Diagnostic Studies:     None        Final Active Diagnoses:    Diagnosis Date Noted POA    PRINCIPAL PROBLEM:  S/P laparoscopic cholecystectomy [Z90.49] 10/06/2022 Not Applicable      Problems Resolved During this Admission:      Discharged Condition: good    Disposition: Home or Self Care    Follow Up:    Patient Instructions:      Diet diabetic     Lifting restrictions   Order Comments: No lifting greater than 10 pounds for 6 weeks from day of surgery.  No pushing/pulling such as vacuuming or raking.  No straining, avoid constipation and take stool softeners as described and laxatives as needed.  No driving while on narcotics and until you can react quickly without pain.     No driving until:   Order Comments: Off of narcotic pain medication, physically able to perform motions necessary for operating motor vehicle     Notify your health care provider if you experience any of the following:  temperature >100.4     Notify your health care provider if you experience any of the following:  persistent nausea and vomiting or diarrhea     Notify your health care provider if you experience any of the following:  severe uncontrolled pain     No dressing needed     Activity as tolerated     Medications:  Reconciled Home Medications:      Medication List      START taking these medications    gabapentin 300 MG capsule  Commonly known as: NEURONTIN  Take 1 capsule (300 mg total) by mouth 3 (three) times daily as needed (abdominal pain).     methocarbamoL 500 MG Tab  Commonly known as: ROBAXIN  Take 1 tablet (500 mg total) by mouth 4 (four) times daily as needed (abdominal pain).        CHANGE how you take these medications    HYDROcodone-acetaminophen 7.5-325 mg per tablet  Commonly known as: NORCO  Take 1 tablet by mouth 3 (three) times daily as needed for Pain.  What  changed: reasons to take this        CONTINUE taking these medications    atenoloL 100 MG tablet  Commonly known as: TENORMIN  Take 100 mg by mouth.     bumetanide 1 MG tablet  Commonly known as: BUMEX  Take 1 mg by mouth once daily.     busPIRone 5 MG Tab  Commonly known as: BUSPAR  Take 5 mg by mouth 3 (three) times daily as needed.     butalbital-acetaminophen-caffeine -40 mg -40 mg per tablet  Commonly known as: FIORICET, ESGIC  TAKE 1 TABLET BY MOUTH EVERY 4 (FOUR) HOURS AS NEEDED FOR HEADACHES MAX DAILY AMOUNT  6 TABLETS     famotidine 20 MG tablet  Commonly known as: PEPCID  Take 1 tablet (20 mg total) by mouth 2 (two) times daily.     furosemide 40 MG tablet  Commonly known as: LASIX     JANUMET 50-1,000 mg per tablet  Generic drug: SITagliptan-metformin  Take 1 tablet by mouth 2 (two) times daily with meals.     lisinopriL 20 MG tablet  Commonly known as: PRINIVIL,ZESTRIL  Take 20 mg by mouth once daily.     omeprazole 40 MG capsule  Commonly known as: PRILOSEC  Take 40 mg by mouth once daily.     rosuvastatin 20 MG tablet  Commonly known as: CRESTOR  Take 20 mg by mouth once daily.     RYBELSUS 14 mg tablet  Generic drug: semaglutide  Take 14 mg by mouth once daily.        STOP taking these medications    docusate sodium 100 MG capsule  Commonly known as: COLACE          Time spent on the discharge of patient: 20 minutes    Maico Virk MD  General Surgery  [facility] - Emergency Dept

## 2022-10-06 NOTE — ASSESSMENT & PLAN NOTE
Kelli Rosas is a 56yoF with abdominal pain following a laparoscopic cholecystectomy on 10/3.    - patient seen and examined  - placed in observation for HIDA scan  - labs and imaging reviewed-- normal WBC, normal bili, normal LFTs; mildly elevated lipase  - subjectively endorsing incisional tenderness  - HIDA scan to assess biliary system  - NPO  - pain control

## 2022-10-06 NOTE — PLAN OF CARE
[place] - Emergency Dept  Discharge Assessment    Primary Care Provider: Anastacio Perez MD     CM discussed discharge planning with pt. Pt stated she will schedule hospital follow up with PCP.     Discharge Assessment (most recent)       BRIEF DISCHARGE ASSESSMENT - 10/06/22 2915          Discharge Planning    Assessment Type Discharge Planning Brief Assessment (P)      Resource/Environmental Concerns none (P)      Support Systems Family members (P)      Assistance Needed none (P)      Equipment Currently Used at Home none (P)      Current Living Arrangements home/apartment/condo (P)      Patient/Family Anticipates Transition to home (P)      Patient/Family Anticipated Services at Transition none (P)      DME Needed Upon Discharge  none (P)      Discharge Plan A Home (P)      Discharge Plan B Other (P)

## 2022-10-06 NOTE — ED NOTES
Report given to BEKA Cueto. Pt aao x 4. NAD updated on plan of care. Denies needs at this time. Family at bedside.

## 2022-10-06 NOTE — HOSPITAL COURSE
Kelli Rosas underwent laparoscopic cholecystectomy on 10/3/22 and represents to the emergency department with abdominal pain. The patient underwent a thorough work-up including comprehensive lab work, a CT scan and a HIDA scan, which did no demonstrate abnormalities. She states that her pain remains, but it appears as normal post-operative abdominal pain. The patient was sent home with a short-course of multi-modal pain regimen. She will follow up with Dr. Ulloa in two weeks. Instructions provided to patient and her family.

## 2022-10-06 NOTE — HPI
Kelli Rosas is a 56yoF with a past medical history significant for HTN, diabetes who underwent laparoscopic cholecystectomy on 10/3/22. She presents to the emergency department with abdominal pain since her surgery. She implicitly states that this pain is different from her previous right upper quadrant pain prior to the surgery. She has no other associated symptoms. Work up is largely normal-- normal WBC, normal bili, normal LFTs. CT scan demonstrates a fluid collection within the gallbladder fossa, which is to be expected in the acute post-operative setting. General surgery consulted for evaluation.     On my exam, the patient is resting in bed. She is alert and oriented. No scleral icterus appreciated. She continues to endorse incisional tenderness. Plan for HIDA scan to assess biliary system. Plan of care discussed with patient, significant other.

## 2022-10-08 LAB — BACTERIA UR CULT: NORMAL

## 2022-10-10 LAB
BACTERIA BLD CULT: NORMAL
BACTERIA BLD CULT: NORMAL

## 2022-10-14 ENCOUNTER — TELEPHONE (OUTPATIENT)
Dept: SURGERY | Facility: CLINIC | Age: 57
End: 2022-10-14
Payer: COMMERCIAL

## 2022-10-14 NOTE — TELEPHONE ENCOUNTER
Spoke with patient informed that Dr. Ulloa is out of the office today. Patient informed message will be forwarded to Dr. Ulloa.

## 2022-10-14 NOTE — TELEPHONE ENCOUNTER
----- Message from Tamara Starks sent at 10/14/2022  9:29 AM CDT -----  Can the clinic reply in [facility]:no              Please refill the medication(s) listed below. Please call the patient when the prescription(s) is ready for  at this phone [phone]              Medication #1HYDROcodone-acetaminophen (NORCO) 7.5-325 mg per tablet            Medication #2              Preferred Pharmacy:[id number] - [place] [address] [facility]

## 2022-10-17 ENCOUNTER — TELEPHONE (OUTPATIENT)
Dept: SURGERY | Facility: CLINIC | Age: 57
End: 2022-10-17
Payer: COMMERCIAL

## 2022-10-17 NOTE — TELEPHONE ENCOUNTER
Patient requesting refill on HYDROcodone-acetaminophen (NORCO) 7.5-325 mg per tablet 12 tablet     Patient informed Dr. Ulloa is out of the office today. Message will be forwarded.

## 2022-10-17 NOTE — TELEPHONE ENCOUNTER
----- Message from Pratik Cohen MA sent at 10/17/2022 10:46 AM CDT -----  Type: Patient Call Back    Who called: Self    What is the request in detail: pt. Is following up on a medication request .    HYDROcodone-acetaminophen (NORCO) 7.5-325 mg per tablet 12 tablet     Can the clinic reply by [facility]?No    Would the patient rather a call back or a response via [facility]? Yes    Best call back number: [phone] (home)

## 2022-10-19 ENCOUNTER — TELEPHONE (OUTPATIENT)
Dept: SURGERY | Facility: CLINIC | Age: 57
End: 2022-10-19

## 2022-10-19 ENCOUNTER — OFFICE VISIT (OUTPATIENT)
Dept: SURGERY | Facility: CLINIC | Age: 57
End: 2022-10-19
Payer: COMMERCIAL

## 2022-10-19 VITALS
DIASTOLIC BLOOD PRESSURE: 76 MMHG | BODY MASS INDEX: 38.31 KG/M2 | HEIGHT: 65 IN | WEIGHT: 229.94 LBS | SYSTOLIC BLOOD PRESSURE: 119 MMHG

## 2022-10-19 DIAGNOSIS — K81.9 CHOLECYSTITIS: Primary | ICD-10-CM

## 2022-10-19 PROCEDURE — 3074F PR MOST RECENT SYSTOLIC BLOOD PRESSURE < 130 MM HG: ICD-10-PCS | Mod: CPTII,S$GLB,, | Performed by: SURGERY

## 2022-10-19 PROCEDURE — 99024 POSTOP FOLLOW-UP VISIT: CPT | Mod: S$GLB,,, | Performed by: SURGERY

## 2022-10-19 PROCEDURE — 99999 PR PBB SHADOW E&M-EST. PATIENT-LVL III: ICD-10-PCS | Mod: PBBFAC,,, | Performed by: SURGERY

## 2022-10-19 PROCEDURE — 1159F MED LIST DOCD IN RCRD: CPT | Mod: CPTII,S$GLB,, | Performed by: SURGERY

## 2022-10-19 PROCEDURE — 1159F PR MEDICATION LIST DOCUMENTED IN MEDICAL RECORD: ICD-10-PCS | Mod: CPTII,S$GLB,, | Performed by: SURGERY

## 2022-10-19 PROCEDURE — 3078F PR MOST RECENT DIASTOLIC BLOOD PRESSURE < 80 MM HG: ICD-10-PCS | Mod: CPTII,S$GLB,, | Performed by: SURGERY

## 2022-10-19 PROCEDURE — 3074F SYST BP LT 130 MM HG: CPT | Mod: CPTII,S$GLB,, | Performed by: SURGERY

## 2022-10-19 PROCEDURE — 4010F ACE/ARB THERAPY RXD/TAKEN: CPT | Mod: CPTII,S$GLB,, | Performed by: SURGERY

## 2022-10-19 PROCEDURE — 99024 PR POST-OP FOLLOW-UP VISIT: ICD-10-PCS | Mod: S$GLB,,, | Performed by: SURGERY

## 2022-10-19 PROCEDURE — 4010F PR ACE/ARB THEARPY RXD/TAKEN: ICD-10-PCS | Mod: CPTII,S$GLB,, | Performed by: SURGERY

## 2022-10-19 PROCEDURE — 99999 PR PBB SHADOW E&M-EST. PATIENT-LVL III: CPT | Mod: PBBFAC,,, | Performed by: SURGERY

## 2022-10-19 PROCEDURE — 3078F DIAST BP <80 MM HG: CPT | Mod: CPTII,S$GLB,, | Performed by: SURGERY

## 2022-10-19 RX ORDER — METHOCARBAMOL 500 MG/1
500 TABLET, FILM COATED ORAL 4 TIMES DAILY
Qty: 40 TABLET | Refills: 0 | Status: SHIPPED | OUTPATIENT
Start: 2022-10-19 | End: 2022-10-29

## 2022-10-19 NOTE — TELEPHONE ENCOUNTER
Spoke with  regarding msg and informed her the doctor denied refill for the hydrocodone. Pt verbalized understanding.       ----- Message from Tanya Russ sent at 10/19/2022  1:36 PM CDT -----  Type: Patient Call Back    Who called: Self    What is the request in detail: Pt says she was supposed to be prescribed hydrocodone along with the robaxin she was prescribed. Pt is at the pharmacy awaiting an answer. Please call.    Can the clinic reply by [facility]? no    Would the patient rather a call back or a response via [facility]? call    Best call back number: [phone]

## 2022-10-19 NOTE — PROGRESS NOTES
56. y/o woman with history of lap mamadou, now about 2 weeks out.  No complaints this am, reports feeling well.    PE: incision c/d/i no evidence of infection or hernia    Impression: post op, doing well    Plan: gradually resume normal activity, normal diet, and follow up as needed.

## 2022-11-30 ENCOUNTER — PATIENT MESSAGE (OUTPATIENT)
Dept: SURGERY | Facility: HOSPITAL | Age: 57
End: 2022-11-30
Payer: COMMERCIAL

## 2024-07-11 ENCOUNTER — HOSPITAL ENCOUNTER (EMERGENCY)
Facility: HOSPITAL | Age: 59
Discharge: HOME | End: 2024-07-11
Attending: INTERNAL MEDICINE
Payer: COMMERCIAL

## 2024-07-11 VITALS
BODY MASS INDEX: 37.99 KG/M2 | HEART RATE: 91 BPM | WEIGHT: 228 LBS | DIASTOLIC BLOOD PRESSURE: 88 MMHG | HEIGHT: 65 IN | OXYGEN SATURATION: 98 % | TEMPERATURE: 98 F | SYSTOLIC BLOOD PRESSURE: 157 MMHG | RESPIRATION RATE: 18 BRPM

## 2024-07-11 DIAGNOSIS — Z20.822 CLOSE EXPOSURE TO COVID-19 VIRUS: Primary | ICD-10-CM

## 2024-07-11 LAB
CTP QC/QA: YES
SARS-COV-2 RDRP RESP QL NAA+PROBE: NEGATIVE

## 2024-07-11 PROCEDURE — 87635 SARS-COV-2 COVID-19 AMP PRB: CPT | Mod: ER

## 2024-07-11 PROCEDURE — 99283 EMERGENCY DEPT VISIT LOW MDM: CPT | Mod: ER

## 2024-07-11 RX ORDER — GUAIFENESIN 100 MG/5ML
100-200 SOLUTION ORAL EVERY 4 HOURS PRN
Qty: 60 ML | Refills: 0 | Status: SHIPPED | OUTPATIENT
Start: 2024-07-11 | End: 2024-07-21

## 2024-07-11 RX ORDER — FLUTICASONE PROPIONATE 50 MCG
1 SPRAY, SUSPENSION (ML) NASAL 2 TIMES DAILY PRN
Qty: 15 G | Refills: 0 | Status: SHIPPED | OUTPATIENT
Start: 2024-07-11

## 2024-07-11 RX ORDER — BENZONATATE 100 MG/1
100 CAPSULE ORAL 3 TIMES DAILY PRN
Qty: 20 CAPSULE | Refills: 0 | Status: SHIPPED | OUTPATIENT
Start: 2024-07-11 | End: 2024-07-21

## 2024-07-11 RX ORDER — CETIRIZINE HYDROCHLORIDE 10 MG/1
10 TABLET ORAL DAILY
Qty: 30 TABLET | Refills: 0 | Status: SHIPPED | OUTPATIENT
Start: 2024-07-11 | End: 2024-08-10

## 2024-07-11 NOTE — Clinical Note
"Kelli Maldonadojoseline Rosas was seen and treated in our emergency department on 7/11/2024.  She may return to work on 07/14/2024.       If you have any questions or concerns, please don't hesitate to call.      Randy Castellanos PA-C"

## 2024-07-12 NOTE — DISCHARGE INSTRUCTIONS
You were seen in the emergency department today for COVID exposure. Your COVID test was negative. Please take all medications as prescribed and as we discussed.  Follow-up with specialist if instructed to do so.  It is important to remember that some problems are difficult to diagnose and may not be found during your Emergency Department visit. Be sure to follow up with your primary care doctor and review all labs/imaging/tests that were performed during this visit with them. Some labs/tests may be outside of the normal range and require non-emergent follow-up and further investigation to help diagnose/exclude/prevent complications or other medical conditions. Return to the emergency department for any new or worsening symptoms. Thank you for allowing me to care for you today, it was my pleasure. I hope you get to feeling better soon!

## 2024-07-12 NOTE — ED PROVIDER NOTES
Encounter Date: 2024       History     Chief Complaint   Patient presents with    COVID-19 Concerns     EXPOSED TO COVID AT WORK, TESTED POSITIVE AT HOME.     Patient is a 58-year-old female with a past medical history of hypertension, diabetes, obesity who presents to the emergency department for evaluation of URI symptoms x 2 days.  Symptoms include dry cough, subjective fever/chills, overall feeling unwell.  Reports co-worker tested positive for COVID and has been working.  Reports she took an at home COVID test yesterday that was faintly positive.  Reports she needs a work note.  She is vaccinated for COVID.  No other complaints today.    The history is provided by the patient.     Review of patient's allergies indicates:  No Known Allergies  Past Medical History:   Diagnosis Date    Diabetes mellitus     Hypertension     Obesity, unspecified      Past Surgical History:   Procedure Laterality Date    ABDOMINAL SURGERY       SECTION       SECTION      HERNIA REPAIR      LAPAROSCOPIC CHOLECYSTECTOMY N/A 10/3/2022    Procedure: CHOLECYSTECTOMY, LAPAROSCOPIC;  Surgeon: Henrique Ulloa MD;  Location: [facility] OR;  Service: General;  Laterality: N/A;  RN PREOP 2022----NAOMY GREEN    NECK MASS EXCISION      pph hemorrhoidectomy       Family History   Problem Relation Name Age of Onset    Hypertension Paternal Grandmother       Social History     Tobacco Use    Smoking status: Never    Smokeless tobacco: Never   Substance Use Topics    Alcohol use: Not Currently     Comment: social    Drug use: No     Review of Systems   Constitutional:  Positive for chills and fever.   HENT:  Negative for congestion, ear pain, rhinorrhea, sore throat and trouble swallowing.    Respiratory:  Positive for cough. Negative for shortness of breath.    Cardiovascular:  Negative for chest pain.   Gastrointestinal:  Negative for abdominal pain, nausea and vomiting.   Genitourinary:  Negative for decreased urine volume.    Musculoskeletal:  Negative for back pain, neck pain and neck stiffness.   Neurological:  Negative for headaches.       Physical Exam     Initial Vitals [07/11/24 2157]   BP Pulse Resp Temp SpO2   (!) 157/88 91 18 98 °F (36.7 °C) 98 %      MAP       --         Physical Exam    Nursing note and vitals reviewed.  Constitutional: She appears well-developed and well-nourished.   HENT:   Head: Normocephalic and atraumatic.   Right Ear: External ear normal.   Left Ear: External ear normal.   There is no posterior oropharyngeal erythema but a postnasal drip is present, no tonsillar swelling, no oropharyngeal exudates, uvula is midline. Normal dentition. No trismus.  No muffled voice. No submandibular swelling. Patient is tolerating secretions without difficulty.  Patient is speaking in full sentences on exam without difficulty.  Bilateral tympanic membranes are pearly gray without erythema, bulging, perforation.  There is no postauricular swelling, or overlying erythema or tenderness to palpation over mastoids bilaterally.    Neck: Carotid bruit is not present.   Normal range of motion.  Cardiovascular:  Normal rate, regular rhythm, normal heart sounds and intact distal pulses.     Exam reveals no gallop and no friction rub.       No murmur heard.  Pulmonary/Chest: Breath sounds normal. No respiratory distress. She has no wheezes. She has no rhonchi. She has no rales.   Abdominal: Abdomen is soft. Bowel sounds are normal. She exhibits no distension. There is no abdominal tenderness. There is no rebound and no guarding.   Musculoskeletal:         General: Normal range of motion.      Cervical back: Normal range of motion.     Neurological: She is alert and oriented to person, place, and time. GCS score is 15. GCS eye subscore is 4. GCS verbal subscore is 5. GCS motor subscore is 6.   Psychiatric: She has a normal mood and affect.         ED Course   Procedures  Labs Reviewed   SARS-COV-2 RDRP GENE    Narrative:     This  test utilizes isothermal nucleic acid amplification technology to detect the SARS-CoV-2 RdRp nucleic acid segment. The analytical sensitivity (limit of detection) is 500 copies/swab.     A POSITIVE result is indicative of the presence of SARS-CoV-2 RNA; clinical correlation with patient history and other diagnostic information is necessary to determine patient infection status.    A NEGATIVE result means that SARS-CoV-2 nucleic acids are not present above the limit of detection. A NEGATIVE result should be treated as presumptive. It does not rule out the possibility of COVID-19 and should not be the sole basis for treatment decisions. If COVID-19 is strongly suspected based on clinical and exposure history, re-testing using an alternate molecular assay should be considered.     Commercial kits are provided by [employer].   _________________________________________________________________   The authorized Fact Sheet for Healthcare Providers and the authorized Fact Sheet for Patients of the ID NOW COVID-19 are available on the FDA website:    https://www.fda.gov/media/533949/download      https://www.fda.gov/media/545889/download              Imaging Results    None          Medications - No data to display  Medical Decision Making  This is an emergent evaluation of a  58-year-old female with a past medical history of hypertension, diabetes, obesity who presents to the emergency department for evaluation of URI symptoms x 2 days.  Symptoms include dry cough, subjective fever/chills, overall feeling unwell.  Positive at home COVID test yesterday.    Patient looks well clinically. There is no posterior oropharyngeal erythema but a postnasal drip is present, no tonsillar swelling, no oropharyngeal exudates, uvula is midline. Normal dentition. No trismus.  No muffled voice. No submandibular swelling. Patient is tolerating secretions without difficulty.  Patient is speaking in full sentences on exam without  difficulty.  Bilateral tympanic membranes are pearly gray without erythema, bulging, perforation.  There is no postauricular swelling, or overlying erythema or tenderness to palpation over mastoids bilaterally. Regular rate rhythm without murmurs.  No carotid bruits appreciated on exam. Lungs are clear to auscultation bilaterally.  Abdomen is soft, nontender, non distended, with normal bowel sounds.     Differential diagnosis includes but is not limited to COVID, flu, other viral syndrome.  Considered but doubt pneumonia.    Workup initiated with COVID swab.  Vital signs, chart, labs, and/or imaging were all reviewed.  See ED course below and interpretations above. My overall impression is COVID. Will discharge home with symptomatic medications. Patient is very well appearing, and in no acute distress. Vital signs are reassuring here in the emergency department, patient is afebrile, breathing comfortable, satting 98 % on room air. Patient/Caregiver is stable for discharge at this time.  Patient/Caregiver was informed of results and plan of care. Patient/Caregiver verbalized understanding of care plan. All questions and concerns were addressed. Discussed strict return precautions with the patient/caregiver. Instructed follow up with primary care provider within 1 week.      Randy Castellanos PA-C    DISCLAIMER: This note was prepared with "[employer] voice recognition transcription software. Garbled syntax, mangled pronouns, and other bizarre constructions may be attributed to that software system.       Amount and/or Complexity of Data Reviewed  Labs: ordered. Decision-making details documented in ED Course.    Risk  OTC drugs.  Prescription drug management.               ED Course as of 07/11/24 2220   Thu Jul 11, 2024 2219 POCT COVID-19 Rapid Screening  COVID negative. [TM]      ED Course User Index  [TM] Randy Castellanos PA-C                           Clinical Impression:  Final diagnoses:  [Z20.822] Close exposure to  COVID-19 virus (Primary)          ED Disposition Condition    Discharge Stable          ED Prescriptions       Medication Sig Dispense Start Date End Date Auth. Provider    benzonatate (TESSALON) 100 MG capsule Take 1 capsule (100 mg total) by mouth 3 (three) times daily as needed. 20 capsule 7/11/2024 7/21/2024 Randy Castellanos PA-C    guaiFENesin 100 mg/5 ml (ROBITUSSIN) 100 mg/5 mL syrup Take 5-10 mLs (100-200 mg total) by mouth every 4 (four) hours as needed for Cough. 60 mL 7/11/2024 7/21/2024 Randy Castellanos PA-C    cetirizine (ZYRTEC) 10 MG tablet Take 1 tablet (10 mg total) by mouth once daily. 30 tablet 7/11/2024 8/10/2024 Randy Castellanos PA-C    fluticasone propionate (FLONASE) 50 mcg/actuation nasal spray 1 spray (50 mcg total) by Each Nostril route 2 (two) times daily as needed for Rhinitis. 15 g 7/11/2024 -- Randy Castellanos PA-C          Follow-up Information       Follow up With Specialties Details Why Contact Info    [facility] ED Emergency Medicine Go to  As needed, If symptoms worsen, or new symptoms develop [address] [facility] [address] [phone]    Primary care doctor  Schedule an appointment as soon as possible for a visit in 3 days               Randy Castellanos PA-C  07/11/24 9452

## (undated) DEVICE — CLOSURE SKIN STERI STRIP 1/2X4

## (undated) DEVICE — TROCAR KII BLLN 12MM 10CM

## (undated) DEVICE — DRESSING ADH ISLAND 2.5 X 3

## (undated) DEVICE — IRRIGATOR ENDOSCOPY DISP.

## (undated) DEVICE — TROCAR ENDOPATH XCEL 5X100MM

## (undated) DEVICE — NDL HYPO REG 25G X 1 1/2

## (undated) DEVICE — HEMOSTAT SURGICEL PWD 3G

## (undated) DEVICE — BAG TISS RETRV MONARCH 10MM

## (undated) DEVICE — GLOVE BIOGEL 7.5

## (undated) DEVICE — SET TUBE PNEUMOCLEAR SE HI FLO

## (undated) DEVICE — ELECTRODE REM PLYHSV RETURN 9

## (undated) DEVICE — SUT VICRYL+ 27 UR-6 VIOL

## (undated) DEVICE — Device

## (undated) DEVICE — TOWEL OR DISP STRL BLUE 4/PK

## (undated) DEVICE — APPLICATOR SURGICEL ENDOSCOPIC

## (undated) DEVICE — SYR 10CC LUER LOCK

## (undated) DEVICE — KIT ANTIFOG

## (undated) DEVICE — APPLICATOR CHLORAPREP ORN 26ML

## (undated) DEVICE — PACK ENDOSCOPY GENERAL

## (undated) DEVICE — CLIP HEMO-LOK ML

## (undated) DEVICE — BLADE SURG CARBON STEEL SZ11

## (undated) DEVICE — BLANKET UPPER BODY 78.7X29.9IN

## (undated) DEVICE — SUT VICRYL PLUS 4-0 P3 18IN

## (undated) DEVICE — COVER OVERHEAD SURG LT BLUE

## (undated) DEVICE — ELECTRODE EZ CLEAN L-HOOK 13.5

## (undated) DEVICE — SUT VICRYL 4-0 ANTIBACT